# Patient Record
Sex: MALE | Race: OTHER | HISPANIC OR LATINO | Employment: OTHER | URBAN - METROPOLITAN AREA
[De-identification: names, ages, dates, MRNs, and addresses within clinical notes are randomized per-mention and may not be internally consistent; named-entity substitution may affect disease eponyms.]

---

## 2024-08-26 ENCOUNTER — OFFICE VISIT (OUTPATIENT)
Dept: PRIMARY CARE CLINIC | Facility: CLINIC | Age: 74
End: 2024-08-26
Payer: MEDICARE

## 2024-08-26 VITALS
HEART RATE: 60 BPM | WEIGHT: 181.56 LBS | OXYGEN SATURATION: 99 % | BODY MASS INDEX: 29.18 KG/M2 | HEIGHT: 66 IN | SYSTOLIC BLOOD PRESSURE: 152 MMHG | DIASTOLIC BLOOD PRESSURE: 86 MMHG

## 2024-08-26 DIAGNOSIS — N40.0 BENIGN PROSTATIC HYPERPLASIA, UNSPECIFIED WHETHER LOWER URINARY TRACT SYMPTOMS PRESENT: ICD-10-CM

## 2024-08-26 DIAGNOSIS — Z00.00 HEALTHCARE MAINTENANCE: ICD-10-CM

## 2024-08-26 DIAGNOSIS — Z12.5 PROSTATE CANCER SCREENING: ICD-10-CM

## 2024-08-26 DIAGNOSIS — I10 ESSENTIAL HYPERTENSION: ICD-10-CM

## 2024-08-26 DIAGNOSIS — E78.5 HYPERLIPIDEMIA, UNSPECIFIED HYPERLIPIDEMIA TYPE: ICD-10-CM

## 2024-08-26 DIAGNOSIS — Z76.89 ENCOUNTER TO ESTABLISH CARE: Primary | ICD-10-CM

## 2024-08-26 DIAGNOSIS — R73.03 PREDIABETES: ICD-10-CM

## 2024-08-26 PROCEDURE — 1160F RVW MEDS BY RX/DR IN RCRD: CPT | Mod: CPTII,S$GLB,, | Performed by: INTERNAL MEDICINE

## 2024-08-26 PROCEDURE — 1123F ACP DISCUSS/DSCN MKR DOCD: CPT | Mod: CPTII,S$GLB,, | Performed by: INTERNAL MEDICINE

## 2024-08-26 PROCEDURE — 3077F SYST BP >= 140 MM HG: CPT | Mod: CPTII,S$GLB,, | Performed by: INTERNAL MEDICINE

## 2024-08-26 PROCEDURE — 3288F FALL RISK ASSESSMENT DOCD: CPT | Mod: CPTII,S$GLB,, | Performed by: INTERNAL MEDICINE

## 2024-08-26 PROCEDURE — 4010F ACE/ARB THERAPY RXD/TAKEN: CPT | Mod: CPTII,S$GLB,, | Performed by: INTERNAL MEDICINE

## 2024-08-26 PROCEDURE — 99999 PR PBB SHADOW E&M-NEW PATIENT-LVL III: CPT | Mod: PBBFAC,,, | Performed by: INTERNAL MEDICINE

## 2024-08-26 PROCEDURE — 1159F MED LIST DOCD IN RCRD: CPT | Mod: CPTII,S$GLB,, | Performed by: INTERNAL MEDICINE

## 2024-08-26 PROCEDURE — 3008F BODY MASS INDEX DOCD: CPT | Mod: CPTII,S$GLB,, | Performed by: INTERNAL MEDICINE

## 2024-08-26 PROCEDURE — 3079F DIAST BP 80-89 MM HG: CPT | Mod: CPTII,S$GLB,, | Performed by: INTERNAL MEDICINE

## 2024-08-26 PROCEDURE — 1126F AMNT PAIN NOTED NONE PRSNT: CPT | Mod: CPTII,S$GLB,, | Performed by: INTERNAL MEDICINE

## 2024-08-26 PROCEDURE — 1101F PT FALLS ASSESS-DOCD LE1/YR: CPT | Mod: CPTII,S$GLB,, | Performed by: INTERNAL MEDICINE

## 2024-08-26 PROCEDURE — 99205 OFFICE O/P NEW HI 60 MIN: CPT | Mod: S$GLB,,, | Performed by: INTERNAL MEDICINE

## 2024-08-26 RX ORDER — FINASTERIDE 5 MG/1
5 TABLET, FILM COATED ORAL NIGHTLY
COMMUNITY
Start: 2024-07-19

## 2024-08-26 RX ORDER — OLMESARTAN MEDOXOMIL 40 MG/1
40 TABLET ORAL DAILY
Qty: 30 TABLET | Refills: 3 | Status: SHIPPED | OUTPATIENT
Start: 2024-08-26 | End: 2025-08-26

## 2024-08-26 RX ORDER — LOSARTAN POTASSIUM 100 MG/1
100 TABLET ORAL
COMMUNITY
Start: 2024-06-10 | End: 2024-08-26 | Stop reason: DRUGHIGH

## 2024-08-26 RX ORDER — HYDROCHLOROTHIAZIDE 25 MG/1
25 TABLET ORAL DAILY PRN
COMMUNITY
Start: 2024-08-02

## 2024-08-26 RX ORDER — ROSUVASTATIN CALCIUM 5 MG/1
5 TABLET, COATED ORAL
COMMUNITY
Start: 2024-07-19

## 2024-08-26 NOTE — PROGRESS NOTES
Subjective:       Patient ID: Nikolai Brown is a 73 y.o. male.    Chief Complaint: new patient       HPI  Nikolai Brown is a 73 y.o. male with HTN, HLD, BPH who presents today for new patient     Reports his PCP will be out on sick leave and needs to get established with new PCP.    Had COVID 1.5 weeks ago. Found out after having fever that lasted 1 day. Had mild symptoms and took Paxlovid when tested positive.     Has history of high blood pressure. Did not take his medication today. Monitors his blood pressure and SBP is usally beteween 130-140s and DBP in the 80s. He is on Lozartan 100mg and HCTZ 25mg. PCP was thinking on stopping HCTZ in the future. Denies headaches, chest pain, palpitations, lightheadedness/dizziness, or leg swelling.    His cholesterol was found elevated and was started on Crestor 5mg daily. Reports tolerating the medication well, no follow up labs yet. Tries to keep diet healthy but sounds he could decrease red meats.    Was having hesitancy and nocturia, improved with Finasteride. His PSA levels have been low, around 0.8.  No family history of Prostate cancer that he knowss of.    Recent labs with A1c of 5.7% and normal FBG. Previously fasting glucose was 114. Not on medication. See labs below.    Does not smoke or drink, drinks around 5 beers a week, no drugs. He is . Originally from [facility].       Advance Care Planning     Date: 08/26/2024    Living Will  During this visit, I engaged the patient  in the voluntary advance care planning process.  The patient and I reviewed the role for advance directives and their purpose in directing future healthcare if the patient's unable to speak for him/herself.  At this point in time, the patient does have full decision-making capacity.  We discussed different extreme health states that he could experience, and reviewed what kind of medical care he would want in those situations.  The patient communicated that if he were comatose and  had little chance of a meaningful recovery, he would not want machines/life-sustaining treatments used. The patient has completed a living will to reflect these preferences and The patient has already designated a healthcare power of  to make decisions on his behalf.  I spent a total of 7 minutes engaging the patient in this advance care planning discussion.          Power of   I initiated the process of voluntary advance care planning today and explained the importance of this process to the patient.  I introduced the concept of advance directives to the patient, as well. Then the patient received detailed information about the importance of designating a Health Care Power of  (HCPOA). He was also instructed to communicate with this person about their wishes for future healthcare, should he become sick and lose decision-making capacity. The patient has not previously appointed a HCPOA. After our discussion, the patient has decided to complete a HCPOA and has appointed his significant other, health care agent:  Lynn Brown  & health care agent number:  [phone] . I encouraged him to communicate with this person about their wishes for future healthcare, should he become sick and lose decision-making capacity.      A total of 7 min was spent on advance care planning, goals of care discussion, emotional support, formulating and communicating prognosis and exploring burden/benefit of various approaches of treatment. This discussion occurred on a fully voluntary basis with the verbal consent of the patient and/or family.        Health Maintenance:  Health Maintenance   Topic Date Due    Hepatitis C Screening  Never done    Lipid Panel  Never done    Shingles Vaccine (1 of 2) Never done    Colorectal Cancer Screening  01/10/2026    TETANUS VACCINE  08/12/2029       Review of Systems   Constitutional: Negative.  Negative for fever and unexpected weight change.   HENT: Negative.    "  Respiratory: Negative.  Negative for apnea (snores).    Cardiovascular: Negative.    Gastrointestinal: Negative.    Genitourinary:  Negative for difficulty urinating.   Musculoskeletal: Negative.    Integumentary:  Negative.   Neurological: Negative.    Psychiatric/Behavioral: Negative.        Past Medical History:   Diagnosis Date    BPH (benign prostatic hyperplasia)     HLD (hyperlipidemia)     HTN (hypertension)        History reviewed. No pertinent surgical history.    Family History   Problem Relation Name Age of Onset    Stroke Mother      Heart failure Mother      Hypertension Mother      Heart disease Mother      Rheum arthritis Mother      No Known Problems Sister 2     Cancer Brother 1         Lymphoma?    Hypertension Brother 1     Seizures Maternal Grandfather         Social History     Socioeconomic History    Marital status: Unknown   Tobacco Use    Smoking status: Never    Smokeless tobacco: Never   Substance and Sexual Activity    Alcohol use: Yes     Alcohol/week: 5.0 standard drinks of alcohol     Types: 5 Cans of beer per week       Current Outpatient Medications   Medication Sig Dispense Refill    finasteride (PROSCAR) 5 mg tablet Take 5 mg by mouth every evening.      hydroCHLOROthiazide (HYDRODIURIL) 25 MG tablet Take 25 mg by mouth daily as needed (High blood pressure). If systolic blood pressure is >150      rosuvastatin (CRESTOR) 5 MG tablet Take 5 mg by mouth.      olmesartan (BENICAR) 40 MG tablet Take 1 tablet (40 mg total) by mouth once daily. 30 tablet 3     No current facility-administered medications for this visit.       Review of patient's allergies indicates:  No Known Allergies      Objective:       Last 3 sets of Vitals        8/26/2024     8:52 AM   Vitals - 1 value per visit   SYSTOLIC 152   DIASTOLIC 86   Pulse 60   SPO2 99 %   Weight (lb) 181.55   Weight (kg) 82.35   Height 5' 6" (1.676 m)   BMI (Calculated) 29.3   Pain Score Zero   Physical Exam  Constitutional:       " General: He is not in acute distress.  HENT:      Head: Normocephalic.      Right Ear: Tympanic membrane, ear canal and external ear normal.      Left Ear: Tympanic membrane, ear canal and external ear normal.      Nose: Nose normal.      Mouth/Throat:      Mouth: Mucous membranes are moist.   Eyes:      General: No scleral icterus.     Extraocular Movements: Extraocular movements intact.      Conjunctiva/sclera: Conjunctivae normal.   Neck:      Vascular: No carotid bruit.      Comments: No goiter.  Cardiovascular:      Rate and Rhythm: Normal rate and regular rhythm.      Pulses: Normal pulses.      Heart sounds: Normal heart sounds.   Pulmonary:      Effort: Pulmonary effort is normal.      Breath sounds: Normal breath sounds.   Abdominal:      General: Bowel sounds are normal. There is no distension.      Palpations: Abdomen is soft. There is no mass.      Tenderness: There is no abdominal tenderness.   Musculoskeletal:         General: No swelling.   Lymphadenopathy:      Cervical: No cervical adenopathy.   Skin:     General: Skin is warm and dry.      Findings: Rash (raised erythematous on neck) present.   Neurological:      General: No focal deficit present.      Mental Status: He is alert and oriented to person, place, and time.   Psychiatric:         Mood and Affect: Mood normal.         Behavior: Behavior normal.                     Assessment:       1. Encounter to establish care    2. Essential hypertension    3. Hyperlipidemia, unspecified hyperlipidemia type    4. Prediabetes    5. Benign prostatic hyperplasia, unspecified whether lower urinary tract symptoms present    6. Prostate cancer screening    7. Healthcare maintenance            Plan:       1. Encounter to establish care    2. Essential hypertension  Overview:  On Losartan and HCTZ but changing to Olmesartan 40 mg daily.     Assessment & Plan:  Blood pressure not as controlled. Will change Losartan to longer actin ARB. May be able to get off  HCTZ. Will monitor daily and if SBP>150 will add HCTZ again.   F/U with nurse for BP check and with me in 1 month.  Encourage healthy low sodium, low fat diet, and low refined carbs.     Orders:  -     CBC Auto Differential; Future; Expected date: 08/26/2024  -     Comprehensive Metabolic Panel; Future; Expected date: 08/26/2024  -     Hemoglobin A1C; Future; Expected date: 08/26/2024  -     olmesartan (BENICAR) 40 MG tablet; Take 1 tablet (40 mg total) by mouth once daily.  Dispense: 30 tablet; Refill: 3    3. Hyperlipidemia, unspecified hyperlipidemia type  Overview:  On Crestor 5mg daily.    Assessment & Plan:  Follow up Lipid panel. May need increase in Crestor.  Lifestyle modification with exercise, weight monitoring, and low fat diet.       Orders:  -     Lipid Panel; Future; Expected date: 08/26/2024    4. Prediabetes  -     Comprehensive Metabolic Panel; Future; Expected date: 08/26/2024  -     Hemoglobin A1C; Future; Expected date: 08/26/2024    5. Benign prostatic hyperplasia, unspecified whether lower urinary tract symptoms present  Overview:  On finasteride 5mg daily.    Assessment & Plan:  Had mild obstructive symptoms in the past now improved. Same treatment.      6. Prostate cancer screening  Assessment & Plan:  - Yearly labs- 3/26/24, outside [email or web address], see copies.  - Colon cancer screening- Cologuard on 1/10/23, negative.  - ACP- completed 8/26/24.  - Vaccination- Due for RSV, Shingles, Pneumonia, COVID booster- declines them today.    Orders:  -     PSA, Screening; Future; Expected date: 08/26/2024    7. Healthcare maintenance  Assessment & Plan:  - Yearly labs- 3/26/24, outside [facility], see copies.  - Colon cancer screening- Cologuard on 1/10/23, negative.  - ACP- completed 8/26/24.  - Vaccination- Due for RSV, Shingles, Pneumonia, COVID booster- declines them today.         Health Maintenance Due   Topic Date Due    Hepatitis C Screening  Never done    Hemoglobin A1c (Prediabetes)  Never done     Lipid Panel  Never done    Shingles Vaccine (1 of 2) Never done    RSV Vaccine (Age 60+ and Pregnant patients) (1 - 1-dose 60+ series) Never done    Pneumococcal Vaccines (Age 65+) (1 of 1 - PCV) Never done    COVID-19 Vaccine (5 - 2023-24 season) 09/01/2023        This includes face to face time and non-face to face time preparing to see the patient (eg, review of tests), obtaining and/or reviewing separately obtained history, documenting clinical information in the electronic or other health record, independently interpreting results and communicating results to the patient/family/caregiver, or care coordinator.       Return to clinic in 1 months.  WS 3    Kady Nowak MD  [facility]  Disclaimer:  This note has been generated using voice-recognition software. There may be grammatical or spelling errors that have been missed during proof-reading

## 2024-08-27 NOTE — ASSESSMENT & PLAN NOTE
- Yearly labs- 3/26/24, outside [facility], see copies.  - Colon cancer screening- Cologuard on 1/10/23, negative.  - ACP- completed 8/26/24.  - Vaccination- Due for RSV, Shingles, Pneumonia, COVID booster- declines them today.

## 2024-08-27 NOTE — ASSESSMENT & PLAN NOTE
Follow up Lipid panel. May need increase in Crestor.  Lifestyle modification with exercise, weight monitoring, and low fat diet.

## 2024-08-27 NOTE — ASSESSMENT & PLAN NOTE
Blood pressure not as controlled. Will change Losartan to longer actin ARB. May be able to get off HCTZ. Will monitor daily and if SBP>150 will add HCTZ again.   F/U with nurse for BP check and with me in 1 month.  Encourage healthy low sodium, low fat diet, and low refined carbs.

## 2024-09-03 ENCOUNTER — TELEPHONE (OUTPATIENT)
Dept: PRIMARY CARE CLINIC | Facility: CLINIC | Age: 74
End: 2024-09-03
Payer: MEDICARE

## 2024-09-03 NOTE — TELEPHONE ENCOUNTER
Message  Received: Today  Virgilio Ramirez Staff  Caller: [phone] (Today,  2:21 PM)  Patient is returning a phone call.    Who left a message for the patient: Marifer    Does patient know what this is regarding:  missed call    Would you like a call back, or a response through your [facility] portal?:   call    Comments:

## 2024-09-09 ENCOUNTER — CLINICAL SUPPORT (OUTPATIENT)
Dept: PRIMARY CARE CLINIC | Facility: CLINIC | Age: 74
End: 2024-09-09
Payer: MEDICARE

## 2024-09-09 VITALS
HEART RATE: 67 BPM | DIASTOLIC BLOOD PRESSURE: 70 MMHG | BODY MASS INDEX: 28.82 KG/M2 | SYSTOLIC BLOOD PRESSURE: 122 MMHG | OXYGEN SATURATION: 96 % | WEIGHT: 178.56 LBS

## 2024-09-09 DIAGNOSIS — I10 ESSENTIAL HYPERTENSION: Primary | ICD-10-CM

## 2024-09-09 PROCEDURE — 99999 PR PBB SHADOW E&M-EST. PATIENT-LVL II: CPT | Mod: PBBFAC,,,

## 2024-09-09 NOTE — PROGRESS NOTES
Patient presented today for a nurse visit to check blood pressure. Pt's allergies, medications, medical history, and falls verified. Vital signs obtained. Blood pressure reading today was 122/70. All readings will be sent to Dr Nowak for review. Education provided on adequate hydration and cardiac diet. AVS given to patient.

## 2024-10-07 ENCOUNTER — LAB VISIT (OUTPATIENT)
Dept: LAB | Facility: HOSPITAL | Age: 74
End: 2024-10-07
Attending: INTERNAL MEDICINE
Payer: MEDICARE

## 2024-10-07 DIAGNOSIS — R73.03 PREDIABETES: ICD-10-CM

## 2024-10-07 DIAGNOSIS — Z12.5 PROSTATE CANCER SCREENING: ICD-10-CM

## 2024-10-07 DIAGNOSIS — I10 ESSENTIAL HYPERTENSION: ICD-10-CM

## 2024-10-07 DIAGNOSIS — E78.5 HYPERLIPIDEMIA, UNSPECIFIED HYPERLIPIDEMIA TYPE: ICD-10-CM

## 2024-10-07 LAB
ALBUMIN SERPL BCP-MCNC: 4.3 G/DL (ref 3.5–5.2)
ALP SERPL-CCNC: 62 U/L (ref 55–135)
ALT SERPL W/O P-5'-P-CCNC: 50 U/L (ref 10–44)
ANION GAP SERPL CALC-SCNC: 10 MMOL/L (ref 8–16)
AST SERPL-CCNC: 36 U/L (ref 10–40)
BASOPHILS # BLD AUTO: 0.05 K/UL (ref 0–0.2)
BASOPHILS NFR BLD: 1 % (ref 0–1.9)
BILIRUB SERPL-MCNC: 0.7 MG/DL (ref 0.1–1)
BUN SERPL-MCNC: 19 MG/DL (ref 8–23)
CALCIUM SERPL-MCNC: 9.9 MG/DL (ref 8.7–10.5)
CHLORIDE SERPL-SCNC: 108 MMOL/L (ref 95–110)
CHOLEST SERPL-MCNC: 178 MG/DL (ref 120–199)
CHOLEST/HDLC SERPL: 3.8 {RATIO} (ref 2–5)
CO2 SERPL-SCNC: 23 MMOL/L (ref 23–29)
COMPLEXED PSA SERPL-MCNC: 1.4 NG/ML (ref 0–4)
CREAT SERPL-MCNC: 1.2 MG/DL (ref 0.5–1.4)
DIFFERENTIAL METHOD BLD: ABNORMAL
EOSINOPHIL # BLD AUTO: 0.1 K/UL (ref 0–0.5)
EOSINOPHIL NFR BLD: 1.6 % (ref 0–8)
ERYTHROCYTE [DISTWIDTH] IN BLOOD BY AUTOMATED COUNT: 13.9 % (ref 11.5–14.5)
EST. GFR  (NO RACE VARIABLE): >60 ML/MIN/1.73 M^2
ESTIMATED AVG GLUCOSE: 111 MG/DL (ref 68–131)
GLUCOSE SERPL-MCNC: 100 MG/DL (ref 70–110)
HBA1C MFR BLD: 5.5 % (ref 4–5.6)
HCT VFR BLD AUTO: 42.4 % (ref 40–54)
HDLC SERPL-MCNC: 47 MG/DL (ref 40–75)
HDLC SERPL: 26.4 % (ref 20–50)
HGB BLD-MCNC: 13.7 G/DL (ref 14–18)
IMM GRANULOCYTES # BLD AUTO: 0.01 K/UL (ref 0–0.04)
IMM GRANULOCYTES NFR BLD AUTO: 0.2 % (ref 0–0.5)
LDLC SERPL CALC-MCNC: 91.4 MG/DL (ref 63–159)
LYMPHOCYTES # BLD AUTO: 1.7 K/UL (ref 1–4.8)
LYMPHOCYTES NFR BLD: 35.3 % (ref 18–48)
MCH RBC QN AUTO: 29 PG (ref 27–31)
MCHC RBC AUTO-ENTMCNC: 32.3 G/DL (ref 32–36)
MCV RBC AUTO: 90 FL (ref 82–98)
MONOCYTES # BLD AUTO: 0.6 K/UL (ref 0.3–1)
MONOCYTES NFR BLD: 11.6 % (ref 4–15)
NEUTROPHILS # BLD AUTO: 2.5 K/UL (ref 1.8–7.7)
NEUTROPHILS NFR BLD: 50.3 % (ref 38–73)
NONHDLC SERPL-MCNC: 131 MG/DL
NRBC BLD-RTO: 0 /100 WBC
PLATELET # BLD AUTO: 221 K/UL (ref 150–450)
PMV BLD AUTO: 10.4 FL (ref 9.2–12.9)
POTASSIUM SERPL-SCNC: 4.4 MMOL/L (ref 3.5–5.1)
PROT SERPL-MCNC: 7.6 G/DL (ref 6–8.4)
RBC # BLD AUTO: 4.72 M/UL (ref 4.6–6.2)
SODIUM SERPL-SCNC: 141 MMOL/L (ref 136–145)
TRIGL SERPL-MCNC: 198 MG/DL (ref 30–150)
WBC # BLD AUTO: 4.9 K/UL (ref 3.9–12.7)

## 2024-10-07 PROCEDURE — 84153 ASSAY OF PSA TOTAL: CPT | Mod: HCNC | Performed by: INTERNAL MEDICINE

## 2024-10-07 PROCEDURE — 83036 HEMOGLOBIN GLYCOSYLATED A1C: CPT | Mod: HCNC | Performed by: INTERNAL MEDICINE

## 2024-10-07 PROCEDURE — 80061 LIPID PANEL: CPT | Mod: HCNC | Performed by: INTERNAL MEDICINE

## 2024-10-07 PROCEDURE — 80053 COMPREHEN METABOLIC PANEL: CPT | Mod: HCNC | Performed by: INTERNAL MEDICINE

## 2024-10-07 PROCEDURE — 85025 COMPLETE CBC W/AUTO DIFF WBC: CPT | Mod: HCNC | Performed by: INTERNAL MEDICINE

## 2024-10-11 ENCOUNTER — OFFICE VISIT (OUTPATIENT)
Dept: PRIMARY CARE CLINIC | Facility: CLINIC | Age: 74
End: 2024-10-11
Payer: MEDICARE

## 2024-10-11 VITALS
DIASTOLIC BLOOD PRESSURE: 82 MMHG | OXYGEN SATURATION: 98 % | BODY MASS INDEX: 29.28 KG/M2 | HEIGHT: 66 IN | WEIGHT: 182.19 LBS | SYSTOLIC BLOOD PRESSURE: 134 MMHG | HEART RATE: 68 BPM

## 2024-10-11 DIAGNOSIS — E78.5 HYPERLIPIDEMIA, UNSPECIFIED HYPERLIPIDEMIA TYPE: ICD-10-CM

## 2024-10-11 DIAGNOSIS — N40.0 BENIGN PROSTATIC HYPERPLASIA WITHOUT LOWER URINARY TRACT SYMPTOMS: ICD-10-CM

## 2024-10-11 DIAGNOSIS — I10 ESSENTIAL HYPERTENSION: Primary | ICD-10-CM

## 2024-10-11 DIAGNOSIS — Z23 NEED FOR INFLUENZA VACCINATION: ICD-10-CM

## 2024-10-11 PROCEDURE — 99214 OFFICE O/P EST MOD 30 MIN: CPT | Mod: HCNC,25,S$GLB, | Performed by: INTERNAL MEDICINE

## 2024-10-11 PROCEDURE — 1157F ADVNC CARE PLAN IN RCRD: CPT | Mod: HCNC,CPTII,S$GLB, | Performed by: INTERNAL MEDICINE

## 2024-10-11 PROCEDURE — 90653 IIV ADJUVANT VACCINE IM: CPT | Mod: HCNC,S$GLB,, | Performed by: INTERNAL MEDICINE

## 2024-10-11 PROCEDURE — 4010F ACE/ARB THERAPY RXD/TAKEN: CPT | Mod: HCNC,CPTII,S$GLB, | Performed by: INTERNAL MEDICINE

## 2024-10-11 PROCEDURE — 1160F RVW MEDS BY RX/DR IN RCRD: CPT | Mod: HCNC,CPTII,S$GLB, | Performed by: INTERNAL MEDICINE

## 2024-10-11 PROCEDURE — 1159F MED LIST DOCD IN RCRD: CPT | Mod: HCNC,CPTII,S$GLB, | Performed by: INTERNAL MEDICINE

## 2024-10-11 PROCEDURE — 1101F PT FALLS ASSESS-DOCD LE1/YR: CPT | Mod: HCNC,CPTII,S$GLB, | Performed by: INTERNAL MEDICINE

## 2024-10-11 PROCEDURE — 3075F SYST BP GE 130 - 139MM HG: CPT | Mod: HCNC,CPTII,S$GLB, | Performed by: INTERNAL MEDICINE

## 2024-10-11 PROCEDURE — 3079F DIAST BP 80-89 MM HG: CPT | Mod: HCNC,CPTII,S$GLB, | Performed by: INTERNAL MEDICINE

## 2024-10-11 PROCEDURE — 3008F BODY MASS INDEX DOCD: CPT | Mod: HCNC,CPTII,S$GLB, | Performed by: INTERNAL MEDICINE

## 2024-10-11 PROCEDURE — 3044F HG A1C LEVEL LT 7.0%: CPT | Mod: HCNC,CPTII,S$GLB, | Performed by: INTERNAL MEDICINE

## 2024-10-11 PROCEDURE — 1126F AMNT PAIN NOTED NONE PRSNT: CPT | Mod: HCNC,CPTII,S$GLB, | Performed by: INTERNAL MEDICINE

## 2024-10-11 PROCEDURE — 99999 PR PBB SHADOW E&M-EST. PATIENT-LVL III: CPT | Mod: PBBFAC,HCNC,, | Performed by: INTERNAL MEDICINE

## 2024-10-11 PROCEDURE — 3288F FALL RISK ASSESSMENT DOCD: CPT | Mod: HCNC,CPTII,S$GLB, | Performed by: INTERNAL MEDICINE

## 2024-10-11 PROCEDURE — G0008 ADMIN INFLUENZA VIRUS VAC: HCPCS | Mod: HCNC,S$GLB,, | Performed by: INTERNAL MEDICINE

## 2024-10-11 RX ORDER — ROSUVASTATIN CALCIUM 10 MG/1
10 TABLET, COATED ORAL DAILY
Qty: 90 TABLET | Refills: 3 | Status: SHIPPED | OUTPATIENT
Start: 2024-10-11 | End: 2025-10-11

## 2024-10-13 NOTE — ASSESSMENT & PLAN NOTE
- Had mild obstructive symptoms in the past now improved.  No longer taking finasteride eyes.  Feels saw palmetto helps.  - If obstructive symptoms returned would try tamsulosin and urology evaluation.  - PSA was within normal limits, 1.4.

## 2024-10-13 NOTE — PROGRESS NOTES
Subjective:       Patient ID: Nikolai Brown is a 74 y.o. male.    Chief Complaint: Results      HPI  Nikolai Brown is a 74 y.o. male with HTN, HLD, Prediabetes, BPH who presents today for Results    Nikolai presents today for follow up.    He reports significant improvement in blood pressure control following medication adjustments. He is currently taking half the dose of the olmesartan, which has effectively lowered his blood pressure. Home blood pressure readings are consistently in the 114-116 range. He denies any side effects or concerns with the current regimen. He is enrolling in the [facility] Program, to allow view his blood pressure history and displays his target goal of less than 140/90. Blood pressure sometimes similar to today's and will try taking the full tablet.    He reports taking finasteride, which has been effective in managing his previously experienced frequent urination. He mentions his brother is taking saw palmetto, a supplement for prostate health, with good results, noting significant improvement in his brother's urinary frequency.  PSA levels are reported as normal.    Recent lab results show slightly low hemoglobin and slightly elevated liver enzymes. Cholesterol and triglycerides are noted to be high, with LDL cholesterol at 91.4 mg/dL, which is above the recommended target of less than 70 mg/dL for patients with hypertension.    He enjoys red meat but is attempting to reduce fat intake in his diet. He is trying to incorporate more vegetables and lean proteins such as chicken and fish. He exercises regularly. He prepares homemade beans, cooked whole with onions and other ingredients, not using canned varieties. His weight remains stable between 175-180 lbs.    He reports taking Rosuvastatin for cholesterol management. The dosage is being increased from its current low dose to 10 mg daily. He prefers to take one pill rather than two.    His fasting glucose and A1c came back  within normal limits.    He reports a recent episode of severe sore throat. COVID-19 testing was negative. He denies any current symptoms related to this recent illness.      ROS:  General: -fever, -chills, -fatigue, -weight gain, -weight loss  Eyes: -vision changes, -redness, -discharge  ENT: -ear pain, -nasal congestion, -sore throat  Cardiovascular: -chest pain, -palpitations, -lower extremity edema  Respiratory: -cough, -shortness of breath  Gastrointestinal: -abdominal pain, -nausea, -vomiting, -diarrhea, -constipation, -blood in stool  Genitourinary: -dysuria, -hematuria, -frequency  Musculoskeletal: -joint pain, -muscle pain  Skin: -rash, -lesion  Neurological: -headache, -dizziness, -numbness, -tingling  Psychiatric: -anxiety, -depression, -sleep difficulty          Past Medical History:   Diagnosis Date    BPH (benign prostatic hyperplasia)     HLD (hyperlipidemia)     HTN (hypertension)        No past surgical history on file.    Family History   Problem Relation Name Age of Onset    Stroke Mother      Heart failure Mother      Hypertension Mother      Heart disease Mother      Rheum arthritis Mother      No Known Problems Sister 2     Cancer Brother 1         Lymphoma?    Hypertension Brother 1     Seizures Maternal Grandfather         Social History     Socioeconomic History    Marital status: Unknown   Tobacco Use    Smoking status: Never    Smokeless tobacco: Never   Substance and Sexual Activity    Alcohol use: Yes     Alcohol/week: 5.0 standard drinks of alcohol     Types: 5 Cans of beer per week     Social Drivers of Health     Financial Resource Strain: Low Risk  (9/2/2024)    Overall Financial Resource Strain (CARDIA)     Difficulty of Paying Living Expenses: Not very hard   Food Insecurity: No Food Insecurity (9/2/2024)    Hunger Vital Sign     Worried About Running Out of Food in the Last Year: Never true     Ran Out of Food in the Last Year: Never true   Physical Activity: Sufficiently Active  "(9/2/2024)    Exercise Vital Sign     Days of Exercise per Week: 6 days     Minutes of Exercise per Session: 60 min   Stress: No Stress Concern Present (9/2/2024)    [employer] - Occupational Stress Questionnaire     Feeling of Stress : Not at all   Housing Stability: Unknown (9/2/2024)    Housing Stability Vital Sign     Unable to Pay for Housing in the Last Year: No       Current Outpatient Medications   Medication Sig Dispense Refill    olmesartan (BENICAR) 40 MG tablet Take 1 tablet (40 mg total) by mouth once daily. 30 tablet 3    rosuvastatin (CRESTOR) 10 MG tablet Take 1 tablet (10 mg total) by mouth once daily. 90 tablet 3     No current facility-administered medications for this visit.       Review of patient's allergies indicates:  No Known Allergies      Objective:       Last 3 sets of Vitals        8/26/2024     8:52 AM 9/9/2024     9:29 AM 10/11/2024    11:19 AM   Vitals - 1 value per visit   SYSTOLIC 152 122 134   DIASTOLIC 86 70 82   Pulse 60 67 68   SPO2 99 % 96 % 98 %   Weight (lb) 181.55 178.57 182.21   Weight (kg) 82.35 81 82.65   Height 5' 6" (1.676 m)  5' 6" (1.676 m)   BMI (Calculated) 29.3  29.4   Pain Score Zero Zero Zero   Physical Exam  Constitutional:       General: He is not in acute distress.     Appearance: Normal appearance.   Eyes:      General: No scleral icterus.     Extraocular Movements: Extraocular movements intact.      Conjunctiva/sclera: Conjunctivae normal.   Neck:      Comments: No goiter.  Cardiovascular:      Rate and Rhythm: Normal rate and regular rhythm.      Pulses: Normal pulses.      Heart sounds: Normal heart sounds.   Pulmonary:      Effort: Pulmonary effort is normal.      Breath sounds: Normal breath sounds.   Abdominal:      General: Bowel sounds are normal. There is no distension.      Palpations: Abdomen is soft.   Musculoskeletal:         General: No swelling. Normal range of motion.   Lymphadenopathy:      Cervical: No cervical " adenopathy.   Skin:     General: Skin is warm and dry.   Neurological:      General: No focal deficit present.      Mental Status: He is alert and oriented to person, place, and time.   Psychiatric:         Mood and Affect: Mood normal.         Behavior: Behavior normal.           CBC:  Recent Labs   Lab 10/07/24  0825   WBC 4.90   RBC 4.72   Hemoglobin 13.7 L   Hematocrit 42.4   Platelets 221   MCV 90   MCH 29.0   MCHC 32.3     CMP:  Recent Labs   Lab 10/07/24  0825   Glucose 100   Calcium 9.9   Albumin 4.3   Total Protein 7.6   Sodium 141   Potassium 4.4   CO2 23   Chloride 108   BUN 19   Creatinine 1.2   Alkaline Phosphatase 62   ALT 50 H   AST 36   Total Bilirubin 0.7     URINALYSIS:       LIPIDS:  Recent Labs   Lab 10/07/24  0825   HDL 47   Cholesterol 178   Triglycerides 198 H   LDL Cholesterol 91.4   HDL/Cholesterol Ratio 26.4   Non-HDL Cholesterol 131   Total Cholesterol/HDL Ratio 3.8     TSH:        A1C:  Recent Labs   Lab 10/07/24  0825   Hemoglobin A1C 5.5       Imaging:  No image results found.      Assessment:       1. Essential hypertension    2. Hyperlipidemia, unspecified hyperlipidemia type    3. Need for influenza vaccination    4. Benign prostatic hyperplasia without lower urinary tract symptoms            Plan:       1. Essential hypertension  Overview:  On Losartan and HCTZ but changing to Olmesartan 40 mg daily.     Assessment & Plan:  - Assessed blood pressure management; noted improvement with current regimen  - Explained prefers blood pressures of <130/80 mmHg.  - Continued Benicar at 40 mg and monitor blood pressure.   - If BP closer to 100 mmHg and feeling tired with the Benicar 40 mg, ok take half and notify doctor.  - Discontinued hydrochlorothiazide 25 mg.    Orders:  -     Comprehensive Metabolic Panel; Future; Expected date: 10/11/2024    2. Hyperlipidemia, unspecified hyperlipidemia type  Overview:  On Crestor 5mg daily.    Assessment & Plan:  - Educated on foods to limit for  managing cholesterol and triglycerides.  - Increased Rosuvastatin from current dose to 10 mg daily.  - Ordered cholesterol panel.  - Advised on vegetables to focus on: broccoli, cauliflower, zucchini, celery, peppers, and other vegetable intake, focusing on low-carbohydrate options.  Decrease red meat, processed foods, and sweets.  - Nikolai to continue regular exercise routine.  - noted mild increase in ALT possibly due to diet.  Will follow-up metabolic panel.    Orders:  -     rosuvastatin (CRESTOR) 10 MG tablet; Take 1 tablet (10 mg total) by mouth once daily.  Dispense: 90 tablet; Refill: 3  -     Lipid Panel; Future; Expected date: 10/11/2024    3. Need for influenza vaccination  -     influenza (adjuvanted) (Fluad) 45 mcg/0.5 mL IM vaccine (> or = 64 yo) 0.5 mL    4. Benign prostatic hyperplasia without lower urinary tract symptoms  Overview:  On finasteride 5mg daily.    Assessment & Plan:  - Had mild obstructive symptoms in the past now improved.  No longer taking finasteride eyes.  Feels saw palmetto helps.  - If obstructive symptoms returned would try tamsulosin and urology evaluation.  - PSA was within normal limits, 1.4.             Health Maintenance Due   Topic Date Due    Hepatitis C Screening  Never done    Shingles Vaccine (1 of 2) Never done    RSV Vaccine (Age 60+ and Pregnant patients) (1 - Risk 60-74 years 1-dose series) Never done    Pneumococcal Vaccines (Age 65+) (1 of 1 - PCV) Never done    COVID-19 Vaccine (5 - 2024-25 season) 09/01/2024        I spent a total of 30 minutes on the day of the visit.This includes face to face time and non-face to face time preparing to see the patient (eg, review of tests), obtaining and/or reviewing separately obtained history, documenting clinical information in the electronic or other health record, independently interpreting results and communicating results to the patient/family/caregiver, or care coordinator.       Return to clinic in 3-4 months.  KEVEN  2    Kady Nowak MD  [facility]  Disclaimer:  This note has been generated using voice-recognition software. There may be grammatical or spelling errors that have been missed during proof-reading

## 2024-10-13 NOTE — ASSESSMENT & PLAN NOTE
- Educated on foods to limit for managing cholesterol and triglycerides.  - Increased Rosuvastatin from current dose to 10 mg daily.  - Ordered cholesterol panel.  - Advised on vegetables to focus on: broccoli, cauliflower, zucchini, celery, peppers, and other vegetable intake, focusing on low-carbohydrate options.  Decrease red meat, processed foods, and sweets.  - Nikolai to continue regular exercise routine.  - noted mild increase in ALT possibly due to diet.  Will follow-up metabolic panel.

## 2024-10-13 NOTE — ASSESSMENT & PLAN NOTE
- Assessed blood pressure management; noted improvement with current regimen  - Explained prefers blood pressures of <130/80 mmHg.  - Continued Benicar at 40 mg and monitor blood pressure.   - If BP closer to 100 mmHg and feeling tired with the Benicar 40 mg, ok take half and notify doctor.  - Discontinued hydrochlorothiazide 25 mg.

## 2024-10-29 DIAGNOSIS — Z00.00 ENCOUNTER FOR MEDICARE ANNUAL WELLNESS EXAM: ICD-10-CM

## 2024-11-20 ENCOUNTER — OFFICE VISIT (OUTPATIENT)
Dept: PRIMARY CARE CLINIC | Facility: CLINIC | Age: 74
End: 2024-11-20
Payer: MEDICARE

## 2024-11-20 VITALS
SYSTOLIC BLOOD PRESSURE: 149 MMHG | WEIGHT: 169.19 LBS | DIASTOLIC BLOOD PRESSURE: 90 MMHG | HEART RATE: 76 BPM | BODY MASS INDEX: 27.31 KG/M2 | OXYGEN SATURATION: 97 %

## 2024-11-20 DIAGNOSIS — G89.29 CHRONIC RIGHT-SIDED LOW BACK PAIN WITH RIGHT-SIDED SCIATICA: Primary | ICD-10-CM

## 2024-11-20 DIAGNOSIS — M54.41 CHRONIC RIGHT-SIDED LOW BACK PAIN WITH RIGHT-SIDED SCIATICA: Primary | ICD-10-CM

## 2024-11-20 DIAGNOSIS — I10 ESSENTIAL HYPERTENSION: ICD-10-CM

## 2024-11-20 PROCEDURE — 1157F ADVNC CARE PLAN IN RCRD: CPT | Mod: HCNC,CPTII,S$GLB, | Performed by: PHYSICIAN ASSISTANT

## 2024-11-20 PROCEDURE — 3288F FALL RISK ASSESSMENT DOCD: CPT | Mod: HCNC,CPTII,S$GLB, | Performed by: PHYSICIAN ASSISTANT

## 2024-11-20 PROCEDURE — 1125F AMNT PAIN NOTED PAIN PRSNT: CPT | Mod: HCNC,CPTII,S$GLB, | Performed by: PHYSICIAN ASSISTANT

## 2024-11-20 PROCEDURE — 1160F RVW MEDS BY RX/DR IN RCRD: CPT | Mod: HCNC,CPTII,S$GLB, | Performed by: PHYSICIAN ASSISTANT

## 2024-11-20 PROCEDURE — 1101F PT FALLS ASSESS-DOCD LE1/YR: CPT | Mod: HCNC,CPTII,S$GLB, | Performed by: PHYSICIAN ASSISTANT

## 2024-11-20 PROCEDURE — 1159F MED LIST DOCD IN RCRD: CPT | Mod: HCNC,CPTII,S$GLB, | Performed by: PHYSICIAN ASSISTANT

## 2024-11-20 PROCEDURE — 4010F ACE/ARB THERAPY RXD/TAKEN: CPT | Mod: HCNC,CPTII,S$GLB, | Performed by: PHYSICIAN ASSISTANT

## 2024-11-20 PROCEDURE — 3080F DIAST BP >= 90 MM HG: CPT | Mod: HCNC,CPTII,S$GLB, | Performed by: PHYSICIAN ASSISTANT

## 2024-11-20 PROCEDURE — 3077F SYST BP >= 140 MM HG: CPT | Mod: HCNC,CPTII,S$GLB, | Performed by: PHYSICIAN ASSISTANT

## 2024-11-20 PROCEDURE — 99214 OFFICE O/P EST MOD 30 MIN: CPT | Mod: HCNC,25,S$GLB, | Performed by: PHYSICIAN ASSISTANT

## 2024-11-20 PROCEDURE — 3008F BODY MASS INDEX DOCD: CPT | Mod: HCNC,CPTII,S$GLB, | Performed by: PHYSICIAN ASSISTANT

## 2024-11-20 PROCEDURE — 3044F HG A1C LEVEL LT 7.0%: CPT | Mod: HCNC,CPTII,S$GLB, | Performed by: PHYSICIAN ASSISTANT

## 2024-11-20 PROCEDURE — 99999 PR PBB SHADOW E&M-EST. PATIENT-LVL III: CPT | Mod: PBBFAC,HCNC,, | Performed by: PHYSICIAN ASSISTANT

## 2024-11-20 PROCEDURE — 96372 THER/PROPH/DIAG INJ SC/IM: CPT | Mod: HCNC,S$GLB,, | Performed by: PHYSICIAN ASSISTANT

## 2024-11-20 RX ORDER — METHYLPREDNISOLONE SOD SUCC 125 MG
125 VIAL (EA) INJECTION ONCE
Status: COMPLETED | OUTPATIENT
Start: 2024-11-20 | End: 2024-11-20

## 2024-11-20 RX ADMIN — Medication 125 MG: at 11:11

## 2024-11-20 NOTE — ASSESSMENT & PLAN NOTE
Elevated today, possibly due to pain. He states he started taking a whole pill of benicar yesterday, continue  Nurse BPC in 2 weeks

## 2024-11-20 NOTE — ASSESSMENT & PLAN NOTE
States steroid injections helped in the past. Will do today, he has muscle relaxer at home. Will try one tonight. Stretching exercises given today  If no change in pain will call. Poss refer to PT

## 2024-11-20 NOTE — PROGRESS NOTES
"  Primary Care Provider Appointment   [facility]        Subjective:       Patient ID:  Nikolai is a 74 y.o. male being seen for Back Pain      Chief Complaint: Back Pain    HPI: 75 yo male presents for back pain. Recurrent issue. Has "bulging disc". Occurring for past week. Occurring in the right lower back. Worse at night when sleeping. Pain radiates to the hip and sometimes down to the ankle. Taking advil and another pain med but doesn't know the name of it, but not helping. No injury or known cause. Denies numbness and tingling. No bowel or bladder incontinence.   He exercises regularly and is overall very active.    Past Medical History:   Diagnosis Date    BPH (benign prostatic hyperplasia)     HLD (hyperlipidemia)     HTN (hypertension)        Review of Systems   Constitutional:  Negative for fever.   Cardiovascular:  Negative for chest pain.   Gastrointestinal:  Negative for abdominal pain.   Genitourinary:  Negative for bladder incontinence, dysuria and hematuria.   Musculoskeletal:  Positive for back pain and leg pain.   Neurological:  Negative for weakness, numbness and headaches.             Health Maintenance         Date Due Completion Date    Hepatitis C Screening Never done ---    Shingles Vaccine (1 of 2) Never done ---    RSV Vaccine (Age 60+ and Pregnant patients) (1 - Risk 60-74 years 1-dose series) Never done ---    Pneumococcal Vaccines (Age 65+) (1 of 1 - PCV) Never done ---    COVID-19 Vaccine (5 - 2024-25 season) 09/01/2024 12/28/2022    Hemoglobin A1c (Prediabetes) 10/07/2025 10/7/2024    Colorectal Cancer Screening 01/10/2026 1/10/2023    TETANUS VACCINE 08/12/2029 8/12/2019    Lipid Panel 10/07/2029 10/7/2024                     Objective:      Vitals:    11/20/24 1107   BP: (!) 149/90   BP Location: Left forearm   Patient Position: Sitting   Pulse: 76   SpO2: 97%   Weight: 76.8 kg (169 lb 3.3 oz)     Estimated body mass index is 27.31 kg/m² as " "calculated from the following:    Height as of 10/11/24: 5' 6" (1.676 m).    Weight as of this encounter: 76.8 kg (169 lb 3.3 oz).  Physical Exam  Constitutional:       Appearance: Normal appearance.   HENT:      Head: Normocephalic and atraumatic.   Musculoskeletal:        Back:    Neurological:      Mental Status: He is alert.   Psychiatric:         Mood and Affect: Mood normal.         Thought Content: Thought content normal.           Assessment and Plan:         1. Chronic right-sided low back pain with right-sided sciatica  Assessment & Plan:  States steroid injections helped in the past. Will do today, he has muscle relaxer at home. Will try one tonight. Stretching exercises given today  If no change in pain will call. Poss refer to PT    Orders:  -     methylPREDNISolone sodium succinate injection 125 mg    2. Essential hypertension  Overview:  On Losartan and HCTZ but changing to Olmesartan 40 mg daily.     Assessment & Plan:  Elevated today, possibly due to pain. He states he started taking a whole pill of benicar yesterday, continue  Nurse BPC in 2 weeks           Follow Up:   F/u in 2 weeks for nurse BPC        Amy Lado, PA-C [facility]   "

## 2024-12-16 DIAGNOSIS — I10 ESSENTIAL HYPERTENSION: ICD-10-CM

## 2024-12-16 RX ORDER — OLMESARTAN MEDOXOMIL 20 MG/1
20 TABLET ORAL DAILY
Qty: 90 TABLET | Refills: 1 | Status: SHIPPED | OUTPATIENT
Start: 2024-12-16 | End: 2025-12-16

## 2025-01-10 ENCOUNTER — LAB VISIT (OUTPATIENT)
Dept: LAB | Facility: HOSPITAL | Age: 75
End: 2025-01-10
Attending: INTERNAL MEDICINE
Payer: MEDICARE

## 2025-01-10 DIAGNOSIS — E78.5 HYPERLIPIDEMIA, UNSPECIFIED HYPERLIPIDEMIA TYPE: ICD-10-CM

## 2025-01-10 DIAGNOSIS — I10 ESSENTIAL HYPERTENSION: ICD-10-CM

## 2025-01-10 LAB
ALBUMIN SERPL BCP-MCNC: 4.5 G/DL (ref 3.5–5.2)
ALP SERPL-CCNC: 56 U/L (ref 40–150)
ALT SERPL W/O P-5'-P-CCNC: 42 U/L (ref 10–44)
ANION GAP SERPL CALC-SCNC: 11 MMOL/L (ref 8–16)
AST SERPL-CCNC: 26 U/L (ref 10–40)
BILIRUB SERPL-MCNC: 0.6 MG/DL (ref 0.1–1)
BUN SERPL-MCNC: 21 MG/DL (ref 8–23)
CALCIUM SERPL-MCNC: 9.8 MG/DL (ref 8.7–10.5)
CHLORIDE SERPL-SCNC: 106 MMOL/L (ref 95–110)
CHOLEST SERPL-MCNC: 171 MG/DL (ref 120–199)
CHOLEST/HDLC SERPL: 3.6 {RATIO} (ref 2–5)
CO2 SERPL-SCNC: 25 MMOL/L (ref 23–29)
CREAT SERPL-MCNC: 1.2 MG/DL (ref 0.5–1.4)
EST. GFR  (NO RACE VARIABLE): >60 ML/MIN/1.73 M^2
GLUCOSE SERPL-MCNC: 93 MG/DL (ref 70–110)
HDLC SERPL-MCNC: 47 MG/DL (ref 40–75)
HDLC SERPL: 27.5 % (ref 20–50)
LDLC SERPL CALC-MCNC: 87 MG/DL (ref 63–159)
NONHDLC SERPL-MCNC: 124 MG/DL
POTASSIUM SERPL-SCNC: 4.6 MMOL/L (ref 3.5–5.1)
PROT SERPL-MCNC: 7.4 G/DL (ref 6–8.4)
SODIUM SERPL-SCNC: 142 MMOL/L (ref 136–145)
TRIGL SERPL-MCNC: 185 MG/DL (ref 30–150)

## 2025-01-10 PROCEDURE — 80053 COMPREHEN METABOLIC PANEL: CPT | Mod: HCNC | Performed by: INTERNAL MEDICINE

## 2025-01-10 PROCEDURE — 80061 LIPID PANEL: CPT | Mod: HCNC | Performed by: INTERNAL MEDICINE

## 2025-01-10 PROCEDURE — 36415 COLL VENOUS BLD VENIPUNCTURE: CPT | Mod: HCNC | Performed by: INTERNAL MEDICINE

## 2025-01-13 ENCOUNTER — OFFICE VISIT (OUTPATIENT)
Dept: PRIMARY CARE CLINIC | Facility: CLINIC | Age: 75
End: 2025-01-13
Payer: MEDICARE

## 2025-01-13 VITALS
WEIGHT: 185.94 LBS | SYSTOLIC BLOOD PRESSURE: 126 MMHG | HEIGHT: 66 IN | OXYGEN SATURATION: 98 % | BODY MASS INDEX: 29.88 KG/M2 | DIASTOLIC BLOOD PRESSURE: 82 MMHG | HEART RATE: 86 BPM

## 2025-01-13 DIAGNOSIS — N40.0 BENIGN PROSTATIC HYPERPLASIA WITHOUT LOWER URINARY TRACT SYMPTOMS: ICD-10-CM

## 2025-01-13 DIAGNOSIS — E78.5 HYPERLIPIDEMIA, UNSPECIFIED HYPERLIPIDEMIA TYPE: ICD-10-CM

## 2025-01-13 DIAGNOSIS — M54.16 LUMBAR RADICULOPATHY, CHRONIC: ICD-10-CM

## 2025-01-13 DIAGNOSIS — Z00.00 HEALTHCARE MAINTENANCE: ICD-10-CM

## 2025-01-13 DIAGNOSIS — I10 ESSENTIAL HYPERTENSION: Primary | ICD-10-CM

## 2025-01-13 PROCEDURE — 1160F RVW MEDS BY RX/DR IN RCRD: CPT | Mod: CPTII,S$GLB,, | Performed by: INTERNAL MEDICINE

## 2025-01-13 PROCEDURE — 3074F SYST BP LT 130 MM HG: CPT | Mod: CPTII,S$GLB,, | Performed by: INTERNAL MEDICINE

## 2025-01-13 PROCEDURE — 4010F ACE/ARB THERAPY RXD/TAKEN: CPT | Mod: CPTII,S$GLB,, | Performed by: INTERNAL MEDICINE

## 2025-01-13 PROCEDURE — 3008F BODY MASS INDEX DOCD: CPT | Mod: CPTII,S$GLB,, | Performed by: INTERNAL MEDICINE

## 2025-01-13 PROCEDURE — 1125F AMNT PAIN NOTED PAIN PRSNT: CPT | Mod: CPTII,S$GLB,, | Performed by: INTERNAL MEDICINE

## 2025-01-13 PROCEDURE — 1157F ADVNC CARE PLAN IN RCRD: CPT | Mod: CPTII,S$GLB,, | Performed by: INTERNAL MEDICINE

## 2025-01-13 PROCEDURE — 3288F FALL RISK ASSESSMENT DOCD: CPT | Mod: CPTII,S$GLB,, | Performed by: INTERNAL MEDICINE

## 2025-01-13 PROCEDURE — 1101F PT FALLS ASSESS-DOCD LE1/YR: CPT | Mod: CPTII,S$GLB,, | Performed by: INTERNAL MEDICINE

## 2025-01-13 PROCEDURE — 99215 OFFICE O/P EST HI 40 MIN: CPT | Mod: 25,S$GLB,, | Performed by: INTERNAL MEDICINE

## 2025-01-13 PROCEDURE — 1159F MED LIST DOCD IN RCRD: CPT | Mod: CPTII,S$GLB,, | Performed by: INTERNAL MEDICINE

## 2025-01-13 PROCEDURE — 3079F DIAST BP 80-89 MM HG: CPT | Mod: CPTII,S$GLB,, | Performed by: INTERNAL MEDICINE

## 2025-01-13 PROCEDURE — 96372 THER/PROPH/DIAG INJ SC/IM: CPT | Mod: S$GLB,,, | Performed by: INTERNAL MEDICINE

## 2025-01-13 PROCEDURE — 99999 PR PBB SHADOW E&M-EST. PATIENT-LVL V: CPT | Mod: PBBFAC,,, | Performed by: INTERNAL MEDICINE

## 2025-01-13 RX ORDER — MAGNESIUM 250 MG
TABLET ORAL ONCE
COMMUNITY

## 2025-01-13 RX ORDER — TIZANIDINE 4 MG/1
4 TABLET ORAL 3 TIMES DAILY PRN
Qty: 30 TABLET | Refills: 0 | Status: SHIPPED | OUTPATIENT
Start: 2025-01-13 | End: 2025-01-24

## 2025-01-13 RX ORDER — MULTIVIT-MINERALS/FOLIC ACID 80 MCG
TABLET,CHEWABLE ORAL
COMMUNITY

## 2025-01-13 RX ORDER — IBUPROFEN 100 MG/5ML
1000 SUSPENSION, ORAL (FINAL DOSE FORM) ORAL DAILY
COMMUNITY

## 2025-01-13 RX ORDER — TRIAMCINOLONE ACETONIDE 40 MG/ML
80 INJECTION, SUSPENSION INTRA-ARTICULAR; INTRAMUSCULAR ONCE
Status: COMPLETED | OUTPATIENT
Start: 2025-01-13 | End: 2025-01-13

## 2025-01-13 RX ORDER — HYDROCORTISONE AND ACETIC ACID 20.75; 10.375 MG/ML; MG/ML
3 SOLUTION AURICULAR (OTIC) 2 TIMES DAILY
Qty: 10 ML | Refills: 0 | Status: SHIPPED | OUTPATIENT
Start: 2025-01-13 | End: 2025-02-03

## 2025-01-13 RX ORDER — LORATADINE 10 MG/1
10 TABLET ORAL DAILY PRN
Qty: 30 TABLET | Refills: 0 | Status: SHIPPED | OUTPATIENT
Start: 2025-01-13 | End: 2026-01-13

## 2025-01-13 RX ORDER — NAPROXEN 500 MG/1
500 TABLET ORAL EVERY 12 HOURS PRN
Qty: 40 TABLET | Refills: 0 | Status: SHIPPED | OUTPATIENT
Start: 2025-01-13

## 2025-01-13 RX ORDER — AMLODIPINE BESYLATE 5 MG/1
5 TABLET ORAL DAILY
Qty: 90 TABLET | Refills: 3 | Status: SHIPPED | OUTPATIENT
Start: 2025-01-13 | End: 2025-02-03 | Stop reason: DRUGHIGH

## 2025-01-13 RX ORDER — OLMESARTAN MEDOXOMIL 40 MG/1
40 TABLET ORAL DAILY
Qty: 90 TABLET | Refills: 2 | Status: SHIPPED | OUTPATIENT
Start: 2025-01-13 | End: 2026-01-13

## 2025-01-13 RX ADMIN — TRIAMCINOLONE ACETONIDE 80 MG: 40 INJECTION, SUSPENSION INTRA-ARTICULAR; INTRAMUSCULAR at 11:01

## 2025-01-13 NOTE — PROGRESS NOTES
Subjective:       Patient ID: Nikolai Brown is a 74 y.o. male.    Chief Complaint: Hypertension    Nikolai Brown is a 74 y.o. male with  HTN, HLD, Prediabetes, BPH  who presents today for Hypertension    Nikolai presents today for follow-up regarding blood pressure and back pain.    HYPERTENSION:  He is currently taking Olmesartan 60 mg (decided to try adding 20mg to his 40mg Olmesartan) for blood pressure management. Holding HCTZ as planned due to increased urination.  No chest pain, lightheadedness, palpitations, or changes in urine. Blood pressure , mostly controlled. Followed by digital medicine.     Labs with normal metabolic panel. Lipid profile is improved but not at goal. Reports with holidays and traveling his levels may be higher. Thinks he can improved levels with diet changes.    BACK PAIN:  He experiences back pain radiating from lower right back to buttock, occasionally extending down the leg. MRI from 12 years ago showed bulging disc at L3 and L4. He has had three significant pain relapses over the past 12 years. While able to exercise, he experiences increased discomfort at night when trying to relax. He occasionally uses Advil for pain management and has history of receiving injections. Had similar pain in the past which has been relieved with steroids. On his last visit had Solumedrol with partial help. Brings copied of a previous lumbar MRI noticing several bulging discs. Denies weakness or numbness. Pain radiates down his right leg. Pain has lasted longer than previously.    Otalgia   There is pain in both ears. This is a new problem. The current episode started 1 to 4 weeks ago. The problem has been gradually improving. The pain is moderate. Pertinent negatives include no coughing, diarrhea, drainage, ear discharge, headaches, hearing loss, neck pain, rash, rhinorrhea, sore throat or vomiting. He has tried NSAIDs for the symptoms. The treatment provided moderate relief. There is no  history of a chronic ear infection, hearing loss or a tympanostomy tube.     EAR SYMPTOMS:  He developed bilateral ear pain during air travel to [place]. Currently reports bilateral ear itching without pain, which he self-treats with alcohol on tissue with some relief.    GASTROINTESTINAL:  He reports excessive stomach noise after eating but denies reflux or acid symptoms. Coffee consumption is limited to one cup in morning and occasionally at night, significantly reduced from previous intake. Denies changes in stool, nausea, or pain.    LABS:  Liver enzymes have normalized from previous elevation. Triglycerides and LDL cholesterol have improved.    IMMUNIZATIONS:  He has received four COVID vaccines but declines pneumonia and shingles vaccines.      ROS:  General: -fever, -chills, -fatigue, -weight gain, -weight loss  Eyes: -vision changes, -redness, -discharge  ENT: -ear pain, -nasal congestion, -sore throat  Cardiovascular: -chest pain, -palpitations, -lower extremity edema  Respiratory: -cough, -shortness of breath  Gastrointestinal: -abdominal pain, -nausea, -vomiting, -diarrhea, -constipation, -blood in stool, -heartburn  Genitourinary: -dysuria, -hematuria, -frequency  Musculoskeletal: -joint pain, -muscle pain, +back pain  Skin: -rash, -lesion  Neurological: -headache, -dizziness, -numbness, -tingling  Psychiatric: -anxiety, -depression, -sleep difficulty             Past Medical History:   Diagnosis Date    BPH (benign prostatic hyperplasia)     HLD (hyperlipidemia)     HTN (hypertension)        History reviewed. No pertinent surgical history.    Family History   Problem Relation Name Age of Onset    Stroke Mother      Heart failure Mother      Hypertension Mother      Heart disease Mother      Rheum arthritis Mother      No Known Problems Sister 2     Cancer Brother 1         Lymphoma?    Hypertension Brother 1     Seizures Maternal Grandfather         Social History     Socioeconomic History    Marital  status: Unknown   Tobacco Use    Smoking status: Never    Smokeless tobacco: Never   Substance and Sexual Activity    Alcohol use: Yes     Alcohol/week: 5.0 standard drinks of alcohol     Types: 5 Cans of beer per week     Social Drivers of Health     Financial Resource Strain: Low Risk  (9/2/2024)    Overall Financial Resource Strain (CARDIA)     Difficulty of Paying Living Expenses: Not very hard   Food Insecurity: No Food Insecurity (9/2/2024)    Hunger Vital Sign     Worried About Running Out of Food in the Last Year: Never true     Ran Out of Food in the Last Year: Never true   Physical Activity: Sufficiently Active (9/2/2024)    Exercise Vital Sign     Days of Exercise per Week: 6 days     Minutes of Exercise per Session: 60 min   Stress: No Stress Concern Present (9/2/2024)    [employer] - Occupational Stress Questionnaire     Feeling of Stress : Not at all   Housing Stability: Unknown (9/2/2024)    Housing Stability Vital Sign     Unable to Pay for Housing in the Last Year: No       Current Outpatient Medications   Medication Sig Dispense Refill    ascorbic acid, vitamin C, (VITAMIN C) 1000 MG tablet Take 1,000 mg by mouth once daily.      finasteride (PROSCAR) 5 mg tablet Take 1 tablet (5 mg total) by mouth every evening. 30 tablet 0    magnesium 250 mg Tab Take by mouth once.      multivit with min-folic acid (CENTRUM ADULT 50 PLUS) 80 mcg Chew Take by mouth.      rosuvastatin (CRESTOR) 10 MG tablet Take 1 tablet (10 mg total) by mouth once daily. 90 tablet 3    acetic acid-hydrocortisone (VOSOL-HC) otic solution Place 3 drops into both ears 2 (two) times daily. for 7 days 10 mL 0    amLODIPine (NORVASC) 5 MG tablet Take 1 tablet (5 mg total) by mouth once daily. 90 tablet 3    loratadine (CLARITIN) 10 mg tablet Take 1 tablet (10 mg total) by mouth daily as needed for Allergies. 30 tablet 0    naproxen (NAPROSYN) 500 MG tablet Take 1 tablet (500 mg total) by mouth every 12  "(twelve) hours as needed (pain). 40 tablet 0    olmesartan (BENICAR) 40 MG tablet Take 1 tablet (40 mg total) by mouth once daily. 90 tablet 2    tiZANidine (ZANAFLEX) 4 MG tablet Take 1 tablet (4 mg total) by mouth 3 (three) times daily as needed (spasm). 30 tablet 0     No current facility-administered medications for this visit.       Review of patient's allergies indicates:  No Known Allergies      Objective:       Last 3 sets of Vitals        10/11/2024    11:19 AM 11/20/2024    11:07 AM 1/13/2025    10:23 AM   Vitals - 1 value per visit   SYSTOLIC 134 149 126   DIASTOLIC 82 90 82   Pulse 68 76 86   SPO2 98 % 97 % 98 %   Weight (lb) 182.21 169.2 185.96   Weight (kg) 82.65 76.75 84.35   Height 5' 6" (1.676 m)  5' 6" (1.676 m)   BMI (Calculated) 29.4  30   Pain Score Zero Seven Six   Physical Exam  Constitutional:       General: He is not in acute distress.  HENT:      Head: Normocephalic.      Right Ear: Tympanic membrane, ear canal and external ear normal.      Left Ear: Tympanic membrane, ear canal and external ear normal.      Ears:      Comments: Mild erythema of external canal.     Nose: Nose normal.      Mouth/Throat:      Mouth: Mucous membranes are moist.   Eyes:      General: No scleral icterus.     Extraocular Movements: Extraocular movements intact.      Conjunctiva/sclera: Conjunctivae normal.   Neck:      Vascular: No carotid bruit.      Comments: No goiter.  Cardiovascular:      Rate and Rhythm: Normal rate and regular rhythm.      Pulses: Normal pulses.      Heart sounds: Normal heart sounds.   Pulmonary:      Effort: Pulmonary effort is normal.      Breath sounds: Normal breath sounds.   Abdominal:      General: Bowel sounds are normal. There is no distension.      Palpations: Abdomen is soft. There is no mass.      Tenderness: There is no abdominal tenderness.   Musculoskeletal:         General: No swelling.      Comments: Discomfort to palpation on right buttock/posterior hip area.  Negative " straight leg or pain with hip rotation.   Lymphadenopathy:      Cervical: No cervical adenopathy.   Skin:     General: Skin is warm and dry.   Neurological:      General: No focal deficit present.      Mental Status: He is alert and oriented to person, place, and time.   Psychiatric:         Mood and Affect: Mood normal.         Behavior: Behavior normal.           CBC:  Recent Labs   Lab 10/07/24  0825   WBC 4.90   RBC 4.72   Hemoglobin 13.7 L   Hematocrit 42.4   Platelets 221   MCV 90   MCH 29.0   MCHC 32.3     CMP:  Recent Labs   Lab 10/07/24  0825 01/10/25  0752   Glucose 100 93   Calcium 9.9 9.8   Albumin 4.3 4.5   Total Protein 7.6 7.4   Sodium 141 142   Potassium 4.4 4.6   CO2 23 25   Chloride 108 106   BUN 19 21   Creatinine 1.2 1.2   Alkaline Phosphatase 62 56   ALT 50 H 42   AST 36 26   Total Bilirubin 0.7 0.6     URINALYSIS:       LIPIDS:  Recent Labs   Lab 10/07/24  0825 01/10/25  0752   HDL 47 47   Cholesterol 178 171   Triglycerides 198 H 185 H   LDL Cholesterol 91.4 87.0   HDL/Cholesterol Ratio 26.4 27.5   Non-HDL Cholesterol 131 124   Total Cholesterol/HDL Ratio 3.8 3.6     TSH:        A1C:  Recent Labs   Lab 10/07/24  0825   Hemoglobin A1C 5.5       Imaging:  No image results found.            Assessment:       1. Essential hypertension    2. Hyperlipidemia, unspecified hyperlipidemia type    3. Lumbar radiculopathy, chronic    4. Benign prostatic hyperplasia without lower urinary tract symptoms    5. Healthcare maintenance            Plan:       1. Essential hypertension  Overview:  On Olmesartan 40mg and now on amlodipine 5 mg daily .     Assessment & Plan:  - Initiated amlodipine 5 mg daily for blood pressure control.  - Changed Benicar to 40 mg daily (Max dose).  - Noted that the patient's blood pressure is stable, with a very good systolic reading (below 90) but a marginally high diastolic reading (82).  - Will focus on monitoring diet, exercise, and close observation rather than changing  medications at this point.      Orders:  -     olmesartan (BENICAR) 40 MG tablet; Take 1 tablet (40 mg total) by mouth once daily.  Dispense: 90 tablet; Refill: 2  -     amLODIPine (NORVASC) 5 MG tablet; Take 1 tablet (5 mg total) by mouth once daily.  Dispense: 90 tablet; Refill: 3    2. Hyperlipidemia, unspecified hyperlipidemia type  Overview:  On Crestor 10 mg daily.    Assessment & Plan:  - Continued rosuvastatin 10 mg daily for cholesterol management.  - Noted improvement in triglyceride levels and decrease in LDL cholesterol.  - Will continue observing cholesterol levels.      3. Lumbar radiculopathy, chronic  Assessment & Plan:  - Noted patient's report of ongoing back pain, particularly at night when relaxing, not interfering with daily activities but causing discomfort.  - Noted patient's report of pain running from lower right back to buttock, sometimes extending down to the ankle. Suspect sciatica but may have component of Sacroiliitis.  - Ordered MRI of lower back to re-evaluate L3-L4 bulging disc.  - Considered steroid injection as a potential treatment option.  - Initiated naproxen for back pain and tizanidine as needed for muscle relaxation, with caution due to somnolence.  - Referred to physical therapy for evaluation and treatment of lower back pain.      Orders:  -     naproxen (NAPROSYN) 500 MG tablet; Take 1 tablet (500 mg total) by mouth every 12 (twelve) hours as needed (pain).  Dispense: 40 tablet; Refill: 0  -     tiZANidine (ZANAFLEX) 4 MG tablet; Take 1 tablet (4 mg total) by mouth 3 (three) times daily as needed (spasm).  Dispense: 30 tablet; Refill: 0  -     MRI Lumbar Spine Without Contrast; Future; Expected date: 01/13/2025  -     Ambulatory Referral/Consult to Physical Therapy; Future; Expected date: 01/20/2025  -     triamcinolone acetonide injection 80 mg    4. Benign prostatic hyperplasia without lower urinary tract symptoms  Overview:  On finasteride 5mg daily.    Assessment &  Plan:  - Continued finasteride for prostate treatment.  - Noted that the patient reports no nocturia.      5. Healthcare maintenance  Assessment & Plan:  - Yearly labs- 10/7//24.  - Colon cancer screening- Javier on 1/10/23, negative.  - ACP- completed 8/26/24.  - Vaccination- Due for RSV, Shingles, Pneumonia, COVID booster- declines them today.      Other orders  -     loratadine (CLARITIN) 10 mg tablet; Take 1 tablet (10 mg total) by mouth daily as needed for Allergies.  Dispense: 30 tablet; Refill: 0  -     acetic acid-hydrocortisone (VOSOL-HC) otic solution; Place 3 drops into both ears 2 (two) times daily. for 7 days  Dispense: 10 mL; Refill: 0        EAR DISCOMFORT:  - Initiated Vosol HC otic drops, 3 drops in both ears twice daily for 1 week.  - Recommend OTC Claritin for allergies if needed.    DIGESTIVE DISCOMFORT:  - Noted patient's report of borborygmi after eating, causing discomfort.  - Evaluated that the patient has normal bowel movements and no reflux or acidity.  - Provided information on non-pharmacological approaches for digestive discomfort, including peppermint tea and IB kuldip, papaya enzyme supplements.  - Advised keeping a food diary to identify potential digestive triggers.    FOLLOW UP:  - Nikolai to continue current exercise regimen.  - Instructed the patient to contact the office if experiencing side effects from new medications or if back pain worsens.  - Obtain labs prior to next appointment.        Health Maintenance Due   Topic Date Due    Hepatitis C Screening  Never done    RSV Vaccine (Age 60+ and Pregnant patients) (1 - Risk 60-74 years 1-dose series) Never done        I spent a total of 46 minutes on the day of the visit.This includes face to face time and non-face to face time preparing to see the patient (eg, review of tests), obtaining and/or reviewing separately obtained history, documenting clinical information in the electronic or other health record, independently  interpreting results and communicating results to the patient/family/caregiver, or care coordinator.     RETURN TO CLINIC IN: 2-3 WEEKS    FOR NEXT VISIT: BLOOD PRESSURE MONITORING, MEDICATION MONITORING, and IMAGING       Kady Nowak MD  [facility]  Disclaimer:  This note has been generated using voice-recognition software. There may be grammatical or spelling errors that have been missed during proof-reading

## 2025-01-14 PROBLEM — M54.16 LUMBAR RADICULOPATHY, CHRONIC: Status: ACTIVE | Noted: 2025-01-14

## 2025-01-14 NOTE — ASSESSMENT & PLAN NOTE
- Noted patient's report of ongoing back pain, particularly at night when relaxing, not interfering with daily activities but causing discomfort.  - Noted patient's report of pain running from lower right back to buttock, sometimes extending down to the ankle. Suspect sciatica but may have component of Sacroiliitis.  - Ordered MRI of lower back to re-evaluate L3-L4 bulging disc.  - Considered steroid injection as a potential treatment option.  - Initiated naproxen for back pain and tizanidine as needed for muscle relaxation, with caution due to somnolence.  - Referred to physical therapy for evaluation and treatment of lower back pain.

## 2025-01-14 NOTE — ASSESSMENT & PLAN NOTE
- Continued rosuvastatin 10 mg daily for cholesterol management.  - Noted improvement in triglyceride levels and decrease in LDL cholesterol.  - Will continue observing cholesterol levels.

## 2025-01-14 NOTE — ASSESSMENT & PLAN NOTE
- Initiated amlodipine 5 mg daily for blood pressure control.  - Changed Benicar to 40 mg daily (Max dose).  - Noted that the patient's blood pressure is stable, with a very good systolic reading (below 90) but a marginally high diastolic reading (82).  - Will focus on monitoring diet, exercise, and close observation rather than changing medications at this point.

## 2025-01-14 NOTE — ASSESSMENT & PLAN NOTE
- Yearly labs- 10/7//24.  - Colon cancer screening- Cologuard on 1/10/23, negative.  - ACP- completed 8/26/24.  - Vaccination- Due for RSV, Shingles, Pneumonia, COVID booster- declines them today.

## 2025-01-17 ENCOUNTER — HOSPITAL ENCOUNTER (OUTPATIENT)
Dept: RADIOLOGY | Facility: HOSPITAL | Age: 75
Discharge: HOME | End: 2025-01-17
Attending: INTERNAL MEDICINE
Payer: MEDICARE

## 2025-01-17 DIAGNOSIS — M54.16 LUMBAR RADICULOPATHY, CHRONIC: ICD-10-CM

## 2025-01-17 PROCEDURE — 72148 MRI LUMBAR SPINE W/O DYE: CPT | Mod: 26,,, | Performed by: RADIOLOGY

## 2025-01-17 PROCEDURE — 72148 MRI LUMBAR SPINE W/O DYE: CPT | Mod: TC

## 2025-01-19 ENCOUNTER — PATIENT MESSAGE (OUTPATIENT)
Dept: PRIMARY CARE CLINIC | Facility: CLINIC | Age: 75
End: 2025-01-19
Payer: MEDICARE

## 2025-01-31 ENCOUNTER — CLINICAL SUPPORT (OUTPATIENT)
Dept: REHABILITATION | Facility: HOSPITAL | Age: 75
End: 2025-01-31
Attending: INTERNAL MEDICINE
Payer: MEDICARE

## 2025-01-31 DIAGNOSIS — M54.16 LUMBAR RADICULOPATHY, CHRONIC: ICD-10-CM

## 2025-01-31 PROCEDURE — 97162 PT EVAL MOD COMPLEX 30 MIN: CPT | Mod: HCNC,PN

## 2025-02-03 ENCOUNTER — OFFICE VISIT (OUTPATIENT)
Dept: PRIMARY CARE CLINIC | Facility: CLINIC | Age: 75
End: 2025-02-03
Payer: MEDICARE

## 2025-02-03 VITALS
SYSTOLIC BLOOD PRESSURE: 145 MMHG | RESPIRATION RATE: 18 BRPM | WEIGHT: 181.75 LBS | TEMPERATURE: 98 F | DIASTOLIC BLOOD PRESSURE: 80 MMHG | OXYGEN SATURATION: 98 % | HEART RATE: 67 BPM | HEIGHT: 66 IN | BODY MASS INDEX: 29.21 KG/M2

## 2025-02-03 DIAGNOSIS — H60.92 OTITIS EXTERNA OF LEFT EAR, UNSPECIFIED CHRONICITY, UNSPECIFIED TYPE: ICD-10-CM

## 2025-02-03 DIAGNOSIS — G89.29 CHRONIC RIGHT-SIDED LOW BACK PAIN WITH RIGHT-SIDED SCIATICA: ICD-10-CM

## 2025-02-03 DIAGNOSIS — Z00.00 HEALTHCARE MAINTENANCE: ICD-10-CM

## 2025-02-03 DIAGNOSIS — N40.0 BENIGN PROSTATIC HYPERPLASIA WITHOUT LOWER URINARY TRACT SYMPTOMS: ICD-10-CM

## 2025-02-03 DIAGNOSIS — M54.41 CHRONIC RIGHT-SIDED LOW BACK PAIN WITH RIGHT-SIDED SCIATICA: ICD-10-CM

## 2025-02-03 DIAGNOSIS — I10 ESSENTIAL HYPERTENSION: Primary | ICD-10-CM

## 2025-02-03 PROCEDURE — 1159F MED LIST DOCD IN RCRD: CPT | Mod: HCNC,CPTII,S$GLB, | Performed by: INTERNAL MEDICINE

## 2025-02-03 PROCEDURE — 1101F PT FALLS ASSESS-DOCD LE1/YR: CPT | Mod: HCNC,CPTII,S$GLB, | Performed by: INTERNAL MEDICINE

## 2025-02-03 PROCEDURE — 3079F DIAST BP 80-89 MM HG: CPT | Mod: HCNC,CPTII,S$GLB, | Performed by: INTERNAL MEDICINE

## 2025-02-03 PROCEDURE — 3288F FALL RISK ASSESSMENT DOCD: CPT | Mod: HCNC,CPTII,S$GLB, | Performed by: INTERNAL MEDICINE

## 2025-02-03 PROCEDURE — 1160F RVW MEDS BY RX/DR IN RCRD: CPT | Mod: HCNC,CPTII,S$GLB, | Performed by: INTERNAL MEDICINE

## 2025-02-03 PROCEDURE — 1126F AMNT PAIN NOTED NONE PRSNT: CPT | Mod: HCNC,CPTII,S$GLB, | Performed by: INTERNAL MEDICINE

## 2025-02-03 PROCEDURE — 4010F ACE/ARB THERAPY RXD/TAKEN: CPT | Mod: HCNC,CPTII,S$GLB, | Performed by: INTERNAL MEDICINE

## 2025-02-03 PROCEDURE — 1157F ADVNC CARE PLAN IN RCRD: CPT | Mod: HCNC,CPTII,S$GLB, | Performed by: INTERNAL MEDICINE

## 2025-02-03 PROCEDURE — 99999 PR PBB SHADOW E&M-EST. PATIENT-LVL IV: CPT | Mod: PBBFAC,HCNC,, | Performed by: INTERNAL MEDICINE

## 2025-02-03 PROCEDURE — 99214 OFFICE O/P EST MOD 30 MIN: CPT | Mod: HCNC,S$GLB,, | Performed by: INTERNAL MEDICINE

## 2025-02-03 PROCEDURE — 3008F BODY MASS INDEX DOCD: CPT | Mod: HCNC,CPTII,S$GLB, | Performed by: INTERNAL MEDICINE

## 2025-02-03 PROCEDURE — 3077F SYST BP >= 140 MM HG: CPT | Mod: HCNC,CPTII,S$GLB, | Performed by: INTERNAL MEDICINE

## 2025-02-03 RX ORDER — DOXAZOSIN 2 MG/1
2 TABLET ORAL NIGHTLY
Qty: 30 TABLET | Refills: 11 | Status: SHIPPED | OUTPATIENT
Start: 2025-02-03 | End: 2026-02-03

## 2025-02-03 RX ORDER — NEOMYCIN SULFATE, POLYMYXIN B SULFATE, HYDROCORTISONE 3.5; 10000; 1 MG/ML; [USP'U]/ML; MG/ML
3 SOLUTION/ DROPS AURICULAR (OTIC) 4 TIMES DAILY
COMMUNITY

## 2025-02-03 NOTE — PROGRESS NOTES
Subjective:       Patient ID: Nikolai Brown is a 74 y.o. male.    Chief Complaint: Follow-up (3 wks)      Nikolai Brown is a 74 y.o. male with HTN, HLD, Prediabetes, BPH who presents today for Follow-up (3 wks)    Reports back pain is better.  Went to physical therapy twice but noted other than 2 exercises tear rest of the exercises were the same has been doing at home.  Takes the medication only when pains significant.  Mostly takes naproxen that helps, with a steroid shot help the most.  Denies weakness or numbness.    MRI of the lumbar spine shows mild bulging disc and mild central canal stenosis and minimal degenerative changes.  Noted inflammatory changes at L4-L5 with facet joint effusions.    Blood pressure noted mildly elevated.  Did not take anti-inflammatories.  Finds amlodipine is causing urinary frequency.  Does not take tamsulosin as finasteride was helping.  Denies hesitancy or other obstructive symptoms.  Will change amlodipine to doxazosin and monitor.  His blood pressure tends to be better at home but noted add other provider offices his blood pressure is also elevated.  Has not validated his blood pressure machine but feels his reliable as is new.    Reports he was not able to afford eardrops and is using polymyxin combination otic solution with improvement of symptoms.        Review of Systems   Constitutional:  Negative for activity change and unexpected weight change.   HENT:  Negative for hearing loss, rhinorrhea and trouble swallowing.    Eyes:  Negative for discharge and visual disturbance.   Respiratory:  Negative for chest tightness and wheezing.    Cardiovascular:  Negative for chest pain and palpitations.   Gastrointestinal:  Negative for blood in stool, constipation, diarrhea and vomiting.   Endocrine: Negative for polydipsia and polyuria.   Genitourinary:  Negative for difficulty urinating, hematuria and urgency.   Musculoskeletal:  Negative for arthralgias, joint swelling and  neck pain.   Neurological:  Negative for weakness and headaches.   Psychiatric/Behavioral:  Negative for confusion and dysphoric mood.       Past Medical History:   Diagnosis Date    BPH (benign prostatic hyperplasia)     HLD (hyperlipidemia)     HTN (hypertension)        No past surgical history on file.    Family History   Problem Relation Name Age of Onset    Stroke Mother      Heart failure Mother      Hypertension Mother      Heart disease Mother      Rheum arthritis Mother      No Known Problems Sister 2     Cancer Brother 1         Lymphoma?    Hypertension Brother 1     Seizures Maternal Grandfather         Social History     Socioeconomic History    Marital status: Unknown   Tobacco Use    Smoking status: Never    Smokeless tobacco: Never   Substance and Sexual Activity    Alcohol use: Yes     Alcohol/week: 5.0 standard drinks of alcohol     Types: 5 Cans of beer per week     Social Drivers of Health     Financial Resource Strain: Low Risk  (9/2/2024)    Overall Financial Resource Strain (CARDIA)     Difficulty of Paying Living Expenses: Not very hard   Food Insecurity: No Food Insecurity (9/2/2024)    Hunger Vital Sign     Worried About Running Out of Food in the Last Year: Never true     Ran Out of Food in the Last Year: Never true   Physical Activity: Sufficiently Active (9/2/2024)    Exercise Vital Sign     Days of Exercise per Week: 6 days     Minutes of Exercise per Session: 60 min   Stress: No Stress Concern Present (9/2/2024)    [employer] [place] [employer] - Occupational Stress Questionnaire     Feeling of Stress : Not at all   Housing Stability: Unknown (9/2/2024)    Housing Stability Vital Sign     Unable to Pay for Housing in the Last Year: No       Current Outpatient Medications   Medication Sig Dispense Refill    ascorbic acid, vitamin C, (VITAMIN C) 1000 MG tablet Take 1,000 mg by mouth once daily.      loratadine (CLARITIN) 10 mg tablet Take 1 tablet (10 mg total) by mouth daily  "as needed for Allergies. 30 tablet 0    magnesium 250 mg Tab Take by mouth once.      multivit with min-folic acid (CENTRUM ADULT 50 PLUS) 80 mcg Chew Take by mouth.      naproxen (NAPROSYN) 500 MG tablet Take 1 tablet (500 mg total) by mouth every 12 (twelve) hours as needed (pain). 40 tablet 0    olmesartan (BENICAR) 40 MG tablet Take 1 tablet (40 mg total) by mouth once daily. 90 tablet 2    rosuvastatin (CRESTOR) 10 MG tablet Take 1 tablet (10 mg total) by mouth once daily. 90 tablet 3    doxazosin (CARDURA) 2 MG tablet Take 1 tablet (2 mg total) by mouth every evening. 30 tablet 11    finasteride (PROSCAR) 5 mg tablet Take 1 tablet (5 mg total) by mouth every evening. 30 tablet 0    neomycin-polymyxin-hydrocortisone (CORTISPORIN) otic solution Place 3 drops into the left ear 4 (four) times daily.       No current facility-administered medications for this visit.       Review of patient's allergies indicates:  No Known Allergies      Objective:       Last 3 sets of Vitals        11/20/2024    11:07 AM 1/13/2025    10:23 AM 2/3/2025     9:44 AM   Vitals - 1 value per visit   SYSTOLIC 149 126 145   DIASTOLIC 90 82 80   Pulse 76 86 67   Temp   98.3 °F (36.8 °C)   Resp   18   SPO2 97 % 98 % 98 %   Weight (lb) 169.2 185.96 181.77   Weight (kg) 76.75 84.35 82.45   Height  5' 6" (1.676 m) 5' 6" (1.676 m)   BMI (Calculated)  30 29.4   Pain Score Seven Six Zero   Physical Exam  Constitutional:       General: He is not in acute distress.  HENT:      Head: Normocephalic.      Right Ear: Tympanic membrane, ear canal and external ear normal.      Left Ear: Tympanic membrane and external ear normal.      Ears:      Comments: Mild erythema of left external canal     Nose: Nose normal.      Mouth/Throat:      Mouth: Mucous membranes are moist.   Eyes:      General: No scleral icterus.     Extraocular Movements: Extraocular movements intact.      Conjunctiva/sclera: Conjunctivae normal.   Neck:      Vascular: No carotid bruit. "      Comments: No goiter.  Cardiovascular:      Rate and Rhythm: Normal rate and regular rhythm.      Pulses: Normal pulses.      Heart sounds: Normal heart sounds.   Pulmonary:      Effort: Pulmonary effort is normal.      Breath sounds: Normal breath sounds.   Abdominal:      General: Bowel sounds are normal. There is no distension.      Palpations: Abdomen is soft. There is no mass.      Tenderness: There is no abdominal tenderness.   Musculoskeletal:         General: No swelling.   Lymphadenopathy:      Cervical: No cervical adenopathy.   Skin:     General: Skin is warm and dry.   Neurological:      General: No focal deficit present.      Mental Status: He is alert and oriented to person, place, and time.   Psychiatric:         Mood and Affect: Mood normal.         Behavior: Behavior normal.           CBC:  Recent Labs   Lab 10/07/24  0825   WBC 4.90   RBC 4.72   Hemoglobin 13.7 L   Hematocrit 42.4   Platelets 221   MCV 90   MCH 29.0   MCHC 32.3     CMP:  Recent Labs   Lab 10/07/24  0825 01/10/25  0752   Glucose 100 93   Calcium 9.9 9.8   Albumin 4.3 4.5   Total Protein 7.6 7.4   Sodium 141 142   Potassium 4.4 4.6   CO2 23 25   Chloride 108 106   BUN 19 21   Creatinine 1.2 1.2   Alkaline Phosphatase 62 56   ALT 50 H 42   AST 36 26   Total Bilirubin 0.7 0.6     URINALYSIS:       LIPIDS:  Recent Labs   Lab 10/07/24  0825 01/10/25  0752   HDL 47 47   Cholesterol 178 171   Triglycerides 198 H 185 H   LDL Cholesterol 91.4 87.0   HDL/Cholesterol Ratio 26.4 27.5   Non-HDL Cholesterol 131 124   Total Cholesterol/HDL Ratio 3.8 3.6     TSH:        A1C:  Recent Labs   Lab 10/07/24  0825   Hemoglobin A1C 5.5       Imaging:  MRI Lumbar Spine Without Contrast  Narrative: EXAMINATION:  MRI LUMBAR SPINE WITHOUT CONTRAST    CLINICAL HISTORY:  Radiculopathy, lumbar regionLumbar radiculopathy, symptoms persist with conservative treatment;    TECHNIQUE:  Sagittal T1, sagittal T2, sagittal STIR, axial T1 and axial T2 weighted images  of the lumbar spine obtained without contrast.    COMPARISON:  None    FINDINGS:  Lumbar spine alignment is within normal limits. The vertebral body heights are well maintained, with no fracture.  Enthesopathic edema of the anterior superior corners of L4 and L3.  Marrow signal otherwise normal.    The conus is normal in appearance.  The adjacent soft tissue structures show no significant abnormalities.    There is multilevel lumbar disc desiccation.  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    L1-L2: There is no focal disc herniation. No significant central canal or neural foraminal narrowing.  Perineural cyst in the left neural foramen.    L2-L3: Circumferential disc bulge.  Mild central canal stenosis.    L3-L4: Circumferential disc bulging.  Mild central canal stenosis.    L4-L5: Circumferential disc bulge and bilateral facet joint effusions.  No significant central canal or neural foraminal narrowing.    L5-S1: Circumferential disc bulge.  No significant central canal or neural foraminal narrowing.  Impression: Minimal degenerative changes with mild central canal stenosis in the mid lumbar region mainly on the basis of short pedicles.    Electronically signed by: Ezekiel Crowder Jr  Date:    01/17/2025  Time:    16:27      Assessment:       1. Essential hypertension    2. Benign prostatic hyperplasia without lower urinary tract symptoms    3. Chronic right-sided low back pain with right-sided sciatica    4. Otitis externa of left ear, unspecified chronicity, unspecified type    5. Healthcare maintenance            Plan:       1. Essential hypertension  Overview:  On Olmesartan 40mg and now on amlodipine 5 mg daily .     Assessment & Plan:  Reports noticing increased frequency with amlodipine.  Not interested in diuretics.    Will try doxazosin to help with blood pressure and any possible BPH symptoms.  Monitor blood pressure at home but advised to bring his device to  validate at the office.      Orders:  -     doxazosin (CARDURA) 2 MG tablet; Take 1 tablet (2 mg total) by mouth every evening.  Dispense: 30 tablet; Refill: 11    2. Benign prostatic hyperplasia without lower urinary tract symptoms  Overview:  On finasteride 5mg daily.    Assessment & Plan:  Reports no obstructive symptoms with finasteride but increased urination since on amlodipine.  Will try doxazosin to help with any obstructive symptoms.    Orders:  -     doxazosin (CARDURA) 2 MG tablet; Take 1 tablet (2 mg total) by mouth every evening.  Dispense: 30 tablet; Refill: 11    3. Chronic right-sided low back pain with right-sided sciatica  Assessment & Plan:  Feels steroid injection help the most.  Taking naproxen and sometimes muscle relaxer when pain is significantly increased.  Noted MRI with DJD, mild stenosis changes, and inflammatory changes at L4-L5.    Continue anti-inflammatories as needed.    Recommend to complete therapy.      4. Otitis externa of left ear, unspecified chronicity, unspecified type  Comments:  Was not able to get prescribed VoSol HC.  Okay to continue polymyxin combination of the solution for 7 days.    5. Healthcare maintenance  Assessment & Plan:  - Yearly labs- 10/7//24.  - Colon cancer screening- Cologuard on 1/10/23, negative.  - ACP- completed 8/26/24.  - Vaccination- Due for RSV, Shingles, Pneumonia, COVID booster- declines them today.         Health Maintenance Due   Topic Date Due    Hepatitis C Screening  Never done    RSV Vaccine (Age 60+ and Pregnant patients) (1 - Risk 60-74 years 1-dose series) Never done        I spent a total of 30 minutes on the day of the visit.This includes face to face time and non-face to face time preparing to see the patient (eg, review of tests), obtaining and/or reviewing separately obtained history, documenting clinical information in the electronic or other health record, independently interpreting results and communicating results to the  patient/family/caregiver, or care coordinator.     RETURN TO CLINIC IN:  In 2 weeks for BP check and evaluation device.  Follow with me in 1 month.    FOR NEXT VISIT: MEDICATION MONITORING and CARE GAPS       Kady Nowak MD  [facility]  Disclaimer:  This note has been generated using voice-recognition software. There may be grammatical or spelling errors that have been missed during proof-reading

## 2025-02-04 NOTE — ASSESSMENT & PLAN NOTE
Reports noticing increased frequency with amlodipine.  Not interested in diuretics.    Will try doxazosin to help with blood pressure and any possible BPH symptoms.  Monitor blood pressure at home but advised to bring his device to validate at the office.

## 2025-02-04 NOTE — ASSESSMENT & PLAN NOTE
Reports no obstructive symptoms with finasteride but increased urination since on amlodipine.  Will try doxazosin to help with any obstructive symptoms.

## 2025-02-04 NOTE — ASSESSMENT & PLAN NOTE
Feels steroid injection help the most.  Taking naproxen and sometimes muscle relaxer when pain is significantly increased.  Noted MRI with DJD, mild stenosis changes, and inflammatory changes at L4-L5.    Continue anti-inflammatories as needed.    Recommend to complete therapy.

## 2025-02-17 NOTE — PROGRESS NOTES
Outpatient Rehab    Physical Therapy Evaluation    Patient Name: Nikolai Brown  MRN: [id number]  YOB: 1950  Today's Date: 2/17/2025    Therapy Diagnosis:   Encounter Diagnosis   Name Primary?    Lumbar radiculopathy, chronic      Physician: Kady Nowak MD    Physician Orders: Eval and Treat  Medical Diagnosis: M54.16 (ICD-10-CM) - Lumbar radiculopathy, chronic     Visit # / Visits Authorized:  1 / 1   Date of Evaluation:  1/31/2025   Insurance Authorization Period: 2/6/2025 to 12/31/2025  Plan of Care Certification:  1/31/2025 to 3/14/2025      Time In: 0805   Time Out: 0900  Total Time: 55   Total Billable Time: 55    Intake Outcome Measure for FOTO Survey    Therapist reviewed FOTO scores for Nikolai Brown on 1/31/2025.   FOTO report - see Media section or FOTO account episode details.     Intake Score:  %         Subjective   History of Present Illness  Nikolai is a 74 y.o. male who reports to physical therapy with a chief concern of Right sided low back pain. According to the patient's chart, Nikolai has a past medical history of BPH (benign prostatic hyperplasia), HLD (hyperlipidemia), and HTN (hypertension). Nikolai has no past surgical history on file.    The patient reports a medical diagnosis of Lumbar radiculopathy, chronic.    Diagnostic tests related to this condition: MRI studies.        Dominant Hand: Right  History of Present Condition/Illness: Patient reports he has been having low back pain for about 10 years off and on. About 2 months ago his low back began having increased pain radiating into his right lower extremity. Pain will typically radiate into his right buttocks and sometimes down to his right foot. Aggravating factors include laying down at the end of the day, and patient has difficulty attributing any other aggravating factors. Recent IRA, which helped with pain.     Pain     Patient reports a current pain level of 4/10.  Pain at worst is reported as 10/10.    Location: right sided low back  Clinical Progression (since onset): Stable  Pain Qualities: Discomfort, Aching, Tightness  Pain-Relieving Factors: Activity modification, Movement  Pain-Aggravating Factors: Bending, Lifting, Lying down           Past Medical History/Physical Systems Review:   Nikolai Brown  has a past medical history of BPH (benign prostatic hyperplasia), HLD (hyperlipidemia), and HTN (hypertension).    Nikolai Brown  has no past surgical history on file.    Nikolai has a current medication list which includes the following prescription(s): ascorbic acid (vitamin c), doxazosin, finasteride, loratadine, magnesium, centrum adult 50 plus, naproxen, neomycin-polymyxin-hydrocortisone, olmesartan, and rosuvastatin.    Review of patient's allergies indicates:  No Known Allergies     Objective      Lumbar Range of Motion   Active (deg) Passive (deg) Pain   Flexion 60       Extension 15       Right Lateral Flexion         Right Rotation         Left Lateral Flexion         Left Rotation                  Hip Range of Motion   Right Hip   Active (deg) Passive (deg) Pain   Flexion         Extension         ABduction         ADduction         External Rotation 90/90 30       External Rotation Prone         Internal Rotation 90/90 25       Internal Rotation Prone             Left Hip   Active (deg) Passive (deg) Pain   Flexion         Extension         ABduction         ADduction         External Rotation 90/90 25       External Rotation Prone         Internal Rotation 90/90 30       Internal Rotation Prone                            Hip Strength - Planes of Motion   Right Strength Right Pain Left Strength Left  Pain   Flexion (L2) 5   5     Extension 4   4     ABduction 4   4     ADduction 5   5     Internal Rotation 4   4     External Rotation 4+   4+         Knee Strength   Right Strength Right Pain Left Strength Left  Pain   Flexion (S2) 5   5     Prone Flexion 4+   4+     Extension (L3) 5   5                    Treatment:   therapeutic activities to improve functional performance for 15 minutes, including:  Patient education on plan of care, prognosis, and home exercise program     Assessment & Plan   Assessment  Nikolai presents with a condition of Moderate complexity.   Presentation of Symptoms: Stable       Functional Limitations: Activity tolerance, Carrying objects, Pain with ADLs/IADLs  Impairments: Impaired physical strength, Activity intolerance, Pain with functional activity    Prognosis: Excellent  Assessment Details: Patient has signs and symptoms presenting as low back pain due to chronic disc bulges and core instability. Recent IRA with relief and reports of performing some home exercises. Patient also presents with core instability and gluteus medius/anna weakness, tightness of hip flexors, lumbar erectors, and piriformis muscles, and functional limitations of prolonged positioning and dynamic activities. Patient would benefit from skilled PT focusing on regaining pain-free active lumbar range of motion along with core stabilization training.       Patient's spiritual, cultural, and educational needs considered and patient agreeable to plan of care and goals.           Goals:   Active       Lifting & carrying objects       Patient will lift 10 pounds        Start:  02/17/25    Expected End:  03/31/25               Pain       Patient will report pain of 0/10 demonstrating a reduction of overall pain       Start:  02/17/25    Expected End:  03/31/25               Range of Motion       Patient will achieve bilateral hip internal rotation ROM 45 degrees in neutral       Start:  02/17/25    Expected End:  03/31/25            Patient will achieve bilateral hip external rotation ROM 45 degrees in neutral       Start:  02/17/25    Expected End:  03/31/25                Solo Alicea, PT

## 2025-02-19 ENCOUNTER — CLINICAL SUPPORT (OUTPATIENT)
Dept: PRIMARY CARE CLINIC | Facility: CLINIC | Age: 75
End: 2025-02-19
Payer: MEDICARE

## 2025-02-19 VITALS
WEIGHT: 183.31 LBS | RESPIRATION RATE: 18 BRPM | OXYGEN SATURATION: 98 % | HEIGHT: 66 IN | BODY MASS INDEX: 29.46 KG/M2 | SYSTOLIC BLOOD PRESSURE: 118 MMHG | HEART RATE: 62 BPM | DIASTOLIC BLOOD PRESSURE: 70 MMHG

## 2025-02-19 DIAGNOSIS — I10 ESSENTIAL HYPERTENSION: Primary | ICD-10-CM

## 2025-02-19 NOTE — PROGRESS NOTES
Nikolai Brown 74 y.o. male is here today for Blood Pressure check.   History of HTN yes.    Review of patient's allergies indicates:  No Known Allergies  Creatinine   Date Value Ref Range Status   01/10/2025 1.2 0.5 - 1.4 mg/dL Final     Sodium   Date Value Ref Range Status   01/10/2025 142 136 - 145 mmol/L Final     Potassium   Date Value Ref Range Status   01/10/2025 4.6 3.5 - 5.1 mmol/L Final   ]  Patient verifies taking blood pressure medications on a regular basis at the same time of the day.     Does patient have record of home blood pressure readings no.     Patient is asymptomatic.       BP: 118/70 , Pulse: 62 .

## 2025-02-22 DIAGNOSIS — Z00.00 ENCOUNTER FOR MEDICARE ANNUAL WELLNESS EXAM: ICD-10-CM

## 2025-03-17 ENCOUNTER — DOCUMENTATION ONLY (OUTPATIENT)
Dept: PRIMARY CARE CLINIC | Facility: CLINIC | Age: 75
End: 2025-03-17
Payer: MEDICARE

## 2025-04-04 ENCOUNTER — OFFICE VISIT (OUTPATIENT)
Dept: PRIMARY CARE CLINIC | Facility: CLINIC | Age: 75
End: 2025-04-04
Payer: MEDICARE

## 2025-04-04 VITALS
SYSTOLIC BLOOD PRESSURE: 132 MMHG | BODY MASS INDEX: 29.42 KG/M2 | OXYGEN SATURATION: 98 % | HEIGHT: 66 IN | DIASTOLIC BLOOD PRESSURE: 86 MMHG | HEART RATE: 67 BPM | TEMPERATURE: 98 F | WEIGHT: 183.06 LBS

## 2025-04-04 DIAGNOSIS — Z87.09 HISTORY OF ASTHMA: ICD-10-CM

## 2025-04-04 DIAGNOSIS — J06.9 URTI (ACUTE UPPER RESPIRATORY INFECTION): Primary | ICD-10-CM

## 2025-04-04 DIAGNOSIS — I10 ESSENTIAL HYPERTENSION: ICD-10-CM

## 2025-04-04 LAB
CTP QC/QA: YES
CTP QC/QA: YES
FLUAV AG NPH QL: NEGATIVE
FLUBV AG NPH QL: NEGATIVE
SARS-COV-2 RDRP RESP QL NAA+PROBE: NEGATIVE

## 2025-04-04 PROCEDURE — 99999 PR PBB SHADOW E&M-EST. PATIENT-LVL III: CPT | Mod: PBBFAC,HCNC,, | Performed by: INTERNAL MEDICINE

## 2025-04-04 RX ORDER — TRIAMCINOLONE ACETONIDE 40 MG/ML
40 INJECTION, SUSPENSION INTRA-ARTICULAR; INTRAMUSCULAR ONCE
Status: COMPLETED | OUTPATIENT
Start: 2025-04-04 | End: 2025-04-04

## 2025-04-04 RX ORDER — PROMETHAZINE HYDROCHLORIDE AND DEXTROMETHORPHAN HYDROBROMIDE 6.25; 15 MG/5ML; MG/5ML
5 SYRUP ORAL EVERY 8 HOURS PRN
Qty: 120 ML | Refills: 0 | Status: SHIPPED | OUTPATIENT
Start: 2025-04-04 | End: 2025-04-14

## 2025-04-04 RX ORDER — ALBUTEROL SULFATE 90 UG/1
2 INHALANT RESPIRATORY (INHALATION) EVERY 6 HOURS PRN
Qty: 18 G | Refills: 3 | Status: SHIPPED | OUTPATIENT
Start: 2025-04-04

## 2025-04-04 RX ADMIN — TRIAMCINOLONE ACETONIDE 40 MG: 40 INJECTION, SUSPENSION INTRA-ARTICULAR; INTRAMUSCULAR at 05:04

## 2025-04-04 NOTE — PROGRESS NOTES
"Patient presented today for a clinic visit.  RN requested to give Triamcinolone Acetonide 40 mg in jection. All information including allergies, falls, medical hx, and medications verified. Vital signs obtained. Order for Triamcinolone Acetonide injection received from Dr Nowak. Injection given using aseptic technique in the .R Ventrogluteal with a 25g 1" needle. Patient instructed to wait 15 minutes for possible side effects.        Call your doctor if:  Joint pain or swelling.  Muscle pain or weakness.  Feeling tired or weak.  Fast, slow, or abnormal heartbeat.  Chest pain  Signs of high blood sugar like confusion, feeling sleepy, unusual thirst or hunger, passing urine more often, flushing, fast breathing, or breath that smells like fruit.  Signs of high blood pressure like very bad headache or dizziness, passing out, or change in eyesight.  Trouble sleeping.       "

## 2025-04-04 NOTE — PROGRESS NOTES
Subjective:       Patient ID: Nikolai Brown is a 74 y.o. male.    Chief Complaint: Cough and Sore Throat    Nikolai Brown is a 74 y.o. male with HTN, HLD, Prediabetes, BPH  who presents today for Cough and Sore Throat    Nikolai presents today for cough that is particularly bothersome at night    He developed a cough 4-5 days ago, initially dry which has progressed to include small amounts of phlegm. He experienced shortness of breath and wheezing needing to use the inhaler. He denies fever, sore throat, chest pain, nasal congestion, or ear problems. He recently returned from a 21-day trip to [place] and [facility] at 2 AM on Monday.    Yesterday checked for COVID and flu with negative test results.    Cough  This is a new problem. The current episode started in the past 7 days. The problem has been gradually worsening. The problem occurs every few hours. The cough is Productive of sputum. Associated symptoms include rhinorrhea, a sore throat, shortness of breath and wheezing. Pertinent negatives include no chest pain, chills, ear congestion, ear pain, fever, headaches, heartburn, hemoptysis, myalgias, nasal congestion, postnasal drip, sweats or weight loss. The symptoms are aggravated by lying down. Risk factors for lung disease include travel. He has tried OTC cough suppressant for the symptoms. The treatment provided mild relief. His past medical history is significant for asthma. There is no history of bronchiectasis, bronchitis, COPD, emphysema or environmental allergies.     CURRENT MEDICATIONS:  He has been taking DayQuil and NightQuil for symptom relief, as well as aspirin and honey with lemon.    MEDICAL HISTORY:  He has a history of asthma from many years ago. He denies any history of reflux or acidity.           Review of Systems   Constitutional:  Negative for chills, fever and weight loss.   HENT:  Positive for rhinorrhea and sore throat. Negative for ear pain and postnasal drip.    Respiratory:   Positive for cough, shortness of breath and wheezing. Negative for hemoptysis.    Cardiovascular:  Negative for chest pain.   Gastrointestinal:  Negative for heartburn.   Musculoskeletal:  Negative for myalgias.   Allergic/Immunologic: Negative for environmental allergies.   Neurological:  Negative for headaches.      Past Medical History:   Diagnosis Date    BPH (benign prostatic hyperplasia)     HLD (hyperlipidemia)     HTN (hypertension)        History reviewed. No pertinent surgical history.    Family History   Problem Relation Name Age of Onset    Stroke Mother      Heart failure Mother      Hypertension Mother      Heart disease Mother      Rheum arthritis Mother      No Known Problems Sister 2     Cancer Brother 1         Lymphoma?    Hypertension Brother 1     Seizures Maternal Grandfather         Social History     Socioeconomic History    Marital status: Unknown   Tobacco Use    Smoking status: Never    Smokeless tobacco: Never   Substance and Sexual Activity    Alcohol use: Yes     Alcohol/week: 5.0 standard drinks of alcohol     Types: 5 Cans of beer per week     Social Drivers of Health     Financial Resource Strain: Medium Risk (2/12/2025)    Overall Financial Resource Strain (CARDIA)     Difficulty of Paying Living Expenses: Somewhat hard   Food Insecurity: Food Insecurity Present (2/12/2025)    Hunger Vital Sign     Worried About Running Out of Food in the Last Year: Sometimes true     Ran Out of Food in the Last Year: Never true   Transportation Needs: No Transportation Needs (2/12/2025)    PRAPARE - Transportation     Lack of Transportation (Medical): No     Lack of Transportation (Non-Medical): No   Physical Activity: Sufficiently Active (2/12/2025)    Exercise Vital Sign     Days of Exercise per Week: 6 days     Minutes of Exercise per Session: 90 min   Stress: No Stress Concern Present (2/12/2025)    [employer] - Occupational Stress Questionnaire     Feeling of  "Stress : Not at all   Housing Stability: Low Risk  (2/12/2025)    Housing Stability Vital Sign     Unable to Pay for Housing in the Last Year: No     Homeless in the Last Year: No       Current Medications[1]    Review of patient's allergies indicates:  No Known Allergies      Objective:       Last 3 sets of Vitals        2/3/2025     9:44 AM 2/19/2025    10:00 AM 4/4/2025     4:08 PM   Vitals - 1 value per visit   SYSTOLIC 145 118 132   DIASTOLIC 80 70 86   Pulse 67 62 67   Temp 98.3 °F (36.8 °C)  97.8 °F (36.6 °C)   Resp 18 18    SPO2 98 % 98 % 98 %   Weight (lb) 181.77 183.31 183.09   Weight (kg) 82.45 83.15 83.05   Height 5' 6" (1.676 m) 5' 6" (1.676 m) 5' 6" (1.676 m)   BMI (Calculated) 29.4 29.6 29.6   Pain Score Zero Zero Four   Physical Exam  Constitutional:       General: He is not in acute distress.     Appearance: Normal appearance.   HENT:      Mouth/Throat:      Pharynx: Posterior oropharyngeal erythema (mild) present.   Eyes:      General: No scleral icterus.     Extraocular Movements: Extraocular movements intact.      Conjunctiva/sclera: Conjunctivae normal.   Neck:      Vascular: No carotid bruit.      Comments: No goiter.  Cardiovascular:      Rate and Rhythm: Normal rate and regular rhythm.      Pulses: Normal pulses.      Heart sounds: Normal heart sounds.   Pulmonary:      Effort: Pulmonary effort is normal.      Breath sounds: Normal breath sounds. No wheezing.   Abdominal:      General: Bowel sounds are normal. There is no distension.      Palpations: Abdomen is soft.      Tenderness: There is no abdominal tenderness.   Musculoskeletal:         General: No swelling. Normal range of motion.   Lymphadenopathy:      Cervical: No cervical adenopathy.   Skin:     General: Skin is warm and dry.   Neurological:      General: No focal deficit present.      Mental Status: He is alert and oriented to person, place, and time.   Psychiatric:         Mood and Affect: Mood normal.         Behavior: Behavior " normal.           CBC:  Recent Labs   Lab 10/07/24  0825   WBC 4.90   RBC 4.72   Hemoglobin 13.7 L   Hematocrit 42.4   Platelets 221   MCV 90   MCH 29.0   MCHC 32.3     CMP:  Recent Labs   Lab 10/07/24  0825 01/10/25  0752   Glucose 100 93   Calcium 9.9 9.8   Albumin 4.3 4.5   Total Protein 7.6 7.4   Sodium 141 142   Potassium 4.4 4.6   CO2 23 25   Chloride 108 106   BUN 19 21   Creatinine 1.2 1.2   Alkaline Phosphatase 62 56   ALT 50 H 42   AST 36 26   Total Bilirubin 0.7 0.6     URINALYSIS:       LIPIDS:  Recent Labs   Lab 10/07/24  0825 01/10/25  0752   HDL 47 47   Cholesterol 178 171   Triglycerides 198 H 185 H   LDL Cholesterol 91.4 87.0   HDL/Cholesterol Ratio 26.4 27.5   Non-HDL Cholesterol 131 124   Total Cholesterol/HDL Ratio 3.8 3.6     TSH:        A1C:  Recent Labs   Lab 10/07/24  0825   Hemoglobin A1C 5.5       Imaging:  MRI Lumbar Spine Without Contrast  Narrative: EXAMINATION:  MRI LUMBAR SPINE WITHOUT CONTRAST    CLINICAL HISTORY:  Radiculopathy, lumbar regionLumbar radiculopathy, symptoms persist with conservative treatment;    TECHNIQUE:  Sagittal T1, sagittal T2, sagittal STIR, axial T1 and axial T2 weighted images of the lumbar spine obtained without contrast.    COMPARISON:  None    FINDINGS:  Lumbar spine alignment is within normal limits. The vertebral body heights are well maintained, with no fracture.  Enthesopathic edema of the anterior superior corners of L4 and L3.  Marrow signal otherwise normal.    The conus is normal in appearance.  The adjacent soft tissue structures show no significant abnormalities.    There is multilevel lumbar disc desiccation.  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    L1-L2: There is no focal disc herniation. No significant central canal or neural foraminal narrowing.  Perineural cyst in the left neural foramen.    L2-L3: Circumferential disc bulge.  Mild central canal stenosis.    L3-L4: Circumferential disc bulging.   Mild central canal stenosis.    L4-L5: Circumferential disc bulge and bilateral facet joint effusions.  No significant central canal or neural foraminal narrowing.    L5-S1: Circumferential disc bulge.  No significant central canal or neural foraminal narrowing.  Impression: Minimal degenerative changes with mild central canal stenosis in the mid lumbar region mainly on the basis of short pedicles.    Electronically signed by: Ezekiel Crowder Jr  Date:    01/17/2025  Time:    16:27      Assessment:       1. URTI (acute upper respiratory infection)    2. History of asthma    3. Essential hypertension            Plan:       1. URTI (acute upper respiratory infection)  -     triamcinolone acetonide injection 40 mg  -     POCT COVID-19 Rapid Screening  -     POCT Influenza A/B Rapid Antigen    2. History of asthma  Assessment & Plan:  Continue rescue inhaler.  Kenalog Injection ordered as points persistent symptoms despite treatment of flu-like symptoms.      3. Essential hypertension  Overview:  On Olmesartan 40mg and now on amlodipine 5 mg daily .     Assessment & Plan:  Controlled blood pressure.  Continue to monitor with same treatment.      Other orders  -     promethazine-dextromethorphan (PROMETHAZINE-DM) 6.25-15 mg/5 mL Syrp; Take 5 mLs by mouth every 8 (eight) hours as needed (cough).  Dispense: 120 mL; Refill: 0  -     albuterol (VENTOLIN HFA) 90 mcg/actuation inhaler; Inhale 2 puffs into the lungs every 6 (six) hours as needed for Wheezing. Rescue  Dispense: 18 g; Refill: 3        ACUTE COUGH:  - Prescribed promethazine syrup for nighttime cough management.  - Advised continuation of OTC medications, including DayQuil, for symptom management.  - Recommend OTC options for nighttime use: Claritin or Mucinex.  - Assessed that the cough might be due to recent travel and environmental change.  - Determined that antibiotics are not indicated at this time, suspecting a viral etiology.  - Noted that the patient has  been taking OTC Nyquil for day and night, and aspirin.      EXPOSURE TO VIRAL COMMUNICABLE DISEASES:  - Noted that the patient recently returned from a 21-day international trip to [place] and [place].  - Documented that the patient performed home COVID-19, influenza A, and influenza B tests, all of which were negative.  - Considered the possibility of exposure to pathogens during the extended flight.    GENERAL MANAGEMENT AND FOLLOW-UP:  - Educated the patient about potential side effects including nervousness, increased energy, hot flashes, emotional changes, and possible impacts on glucose and blood pressure.  - Instructed the patient to contact the office if symptoms do not improve in a few days or worsen.  - Advised the patient to monitor symptoms and return if condition deteriorates or does not improve in a few days.        Health Maintenance Due   Topic Date Due    Hepatitis C Screening  Never done    RSV Vaccine (Age 60+ and Pregnant patients) (1 - Risk 60-74 years 1-dose series) Never done        I spent a total of 30 minutes on the day of the visit.This includes face to face time and non-face to face time preparing to see the patient (eg, review of tests), obtaining and/or reviewing separately obtained history, documenting clinical information in the electronic or other health record, independently interpreting results and communicating results to the patient/family/caregiver, or care coordinator.     RETURN TO CLINIC IN:  As scheduled or earlier if needed    FOR NEXT VISIT: MEDICATION MONITORING       Kady Nowak MD  [facility]  Disclaimer:  This note has been generated using voice-recognition software. There may be grammatical or spelling errors that have been missed during proof-reading           [1]   Current Outpatient Medications   Medication Sig Dispense Refill    ascorbic acid, vitamin C, (VITAMIN C) 1000 MG tablet Take 1,000 mg by mouth once daily.      doxazosin (CARDURA) 2 MG tablet  Take 1 tablet (2 mg total) by mouth every evening. 30 tablet 11    finasteride (PROSCAR) 5 mg tablet Take 1 tablet (5 mg total) by mouth every evening. 30 tablet 3    loratadine (CLARITIN) 10 mg tablet Take 1 tablet (10 mg total) by mouth daily as needed for Allergies. 30 tablet 0    magnesium 250 mg Tab Take by mouth once.      multivit with min-folic acid (CENTRUM ADULT 50 PLUS) 80 mcg Chew Take by mouth.      naproxen (NAPROSYN) 500 MG tablet Take 1 tablet (500 mg total) by mouth every 12 (twelve) hours as needed (pain). 40 tablet 0    olmesartan (BENICAR) 40 MG tablet Take 1 tablet (40 mg total) by mouth once daily. 90 tablet 2    rosuvastatin (CRESTOR) 10 MG tablet Take 1 tablet (10 mg total) by mouth once daily. 90 tablet 3    albuterol (VENTOLIN HFA) 90 mcg/actuation inhaler Inhale 2 puffs into the lungs every 6 (six) hours as needed for Wheezing. Rescue 18 g 3    neomycin-polymyxin-hydrocortisone (CORTISPORIN) otic solution Place 3 drops into the left ear 4 (four) times daily.      promethazine-dextromethorphan (PROMETHAZINE-DM) 6.25-15 mg/5 mL Syrp Take 5 mLs by mouth every 8 (eight) hours as needed (cough). 120 mL 0     No current facility-administered medications for this visit.

## 2025-04-05 NOTE — ASSESSMENT & PLAN NOTE
Continue rescue inhaler.  Kenalog Injection ordered as points persistent symptoms despite treatment of flu-like symptoms.

## 2025-05-05 ENCOUNTER — OFFICE VISIT (OUTPATIENT)
Dept: PRIMARY CARE CLINIC | Facility: CLINIC | Age: 75
End: 2025-05-05
Payer: MEDICARE

## 2025-05-05 VITALS
HEART RATE: 63 BPM | WEIGHT: 180.13 LBS | DIASTOLIC BLOOD PRESSURE: 72 MMHG | SYSTOLIC BLOOD PRESSURE: 124 MMHG | HEIGHT: 66 IN | OXYGEN SATURATION: 98 % | BODY MASS INDEX: 28.95 KG/M2

## 2025-05-05 DIAGNOSIS — E78.5 HYPERLIPIDEMIA, UNSPECIFIED HYPERLIPIDEMIA TYPE: ICD-10-CM

## 2025-05-05 DIAGNOSIS — I10 ESSENTIAL HYPERTENSION: Primary | ICD-10-CM

## 2025-05-05 DIAGNOSIS — Z00.00 HEALTHCARE MAINTENANCE: ICD-10-CM

## 2025-05-05 DIAGNOSIS — N40.0 BENIGN PROSTATIC HYPERPLASIA WITHOUT LOWER URINARY TRACT SYMPTOMS: ICD-10-CM

## 2025-05-05 PROCEDURE — 99999 PR PBB SHADOW E&M-EST. PATIENT-LVL IV: CPT | Mod: PBBFAC,HCNC,, | Performed by: INTERNAL MEDICINE

## 2025-05-05 RX ORDER — PANTOPRAZOLE SODIUM 40 MG/1
40 TABLET, DELAYED RELEASE ORAL DAILY
Qty: 30 TABLET | Refills: 2 | Status: SHIPPED | OUTPATIENT
Start: 2025-05-05 | End: 2026-05-05

## 2025-05-05 RX ORDER — FLUTICASONE PROPIONATE 50 MCG
2 SPRAY, SUSPENSION (ML) NASAL DAILY
Qty: 16 G | Refills: 3 | Status: SHIPPED | OUTPATIENT
Start: 2025-05-05

## 2025-05-05 NOTE — PROGRESS NOTES
Subjective:       Patient ID: Nikolai Brown is a 74 y.o. male.    Chief Complaint: Hypertension and Medication Management      HPI  Nikolai Brown is a 74 y.o. male with HTN, HLD, Prediabetes, BPH  who presents today for Hypertension and Medication Management    He is followed by our [facility] and blood pressure has been well controlled. Watching his diet and exercising. Noted elevated triglycerides in the past that were improving.    He takes Doxazosin to reduce nocturia. Frequency ranges from 0-2 times per night, with increased frequency noted with alcohol consumption, particularly beer. He reports normal urinary stream without straining.    He reports persistent throat discomfort that has not fully resolved since initial treatment with injectable medication and syrup one month ago. He recently contracted a virus from his daughter. He manages symptoms by gargling with diluted hydrogen peroxide during episodes of discomfort, as previously prescribed by another physician.    He sleeps 6-8 hours per night using one pillow for head elevation and an additional pillow for side positioning.    Travelled to [place] and [facility]. Walked long distances with no difficulty. Reports tries to watch his diet but sometimes gets tired of vegetables and prefers beans and rice.    4Ms for Medical Decision-Making in Older Adults    Last Completed EAWV:  None    MEDICATIONS:  High Risk Medications:  Total Active Medications: 1  doxazosin - 2 MG    MOBILITY:  Activities of Daily Living:       No data to display              Fall Risk:      5/5/2025     9:00 AM 4/4/2025     4:20 PM 2/3/2025     9:40 AM   Fall Risk Assessment - Outpatient   Mobility Status Ambulatory Ambulatory Ambulatory   Number of falls 0 0 0   Identified as fall risk False False False     Disability Status:       No data to display              Nutrition Screening:       No data to display             Screening Score: 0-7 Malnourished, 8-11 At Risk,  12-14 Normal  Get Up and Go:       No data to display              Whisper Test:       No data to display                    MENTATION:   Has Dementia Dx: No  Has Anxiety Dx: No    Depression Patient Health Questionnaire:      1/13/2025    10:25 AM   Depression Patient Health Questionnaire   Over the last two weeks how often have you been bothered by little interest or pleasure in doing things Not at all   Over the last two weeks how often have you been bothered by feeling down, depressed or hopeless Not at all   PHQ-2 Total Score 0     Cognitive Function Screening:       No data to display              Cognitive Function Screening Total - Less than 4 = Abnormal,  Greater than or equal to 4 = Normal        WHAT MATTERS MOST:  Advance Care Planning   ACP Status:   Patient has had an ACP conversation  Living Will: Yes  Power of : Yes  [place]: No                Review of Systems   Constitutional:  Negative for activity change and unexpected weight change.   HENT:  Positive for sore throat. Negative for hearing loss, rhinorrhea and trouble swallowing.    Eyes:  Negative for discharge and visual disturbance.   Respiratory:  Negative for chest tightness and wheezing.    Cardiovascular:  Negative for chest pain and palpitations.   Gastrointestinal:  Negative for blood in stool, constipation, diarrhea and vomiting.   Endocrine: Negative for polydipsia and polyuria.   Genitourinary:  Negative for difficulty urinating, hematuria and urgency.   Musculoskeletal:  Negative for arthralgias, joint swelling and neck pain.   Neurological:  Negative for weakness and headaches.   Psychiatric/Behavioral:  Negative for confusion and dysphoric mood.       Past Medical History:   Diagnosis Date    BPH (benign prostatic hyperplasia)     HLD (hyperlipidemia)     HTN (hypertension)        No past surgical history on file.    Family History   Problem Relation Name Age of Onset    Stroke Mother      Heart failure Mother       Hypertension Mother      Heart disease Mother      Rheum arthritis Mother      No Known Problems Sister 2     Cancer Brother 1         Lymphoma?    Hypertension Brother 1     Seizures Maternal Grandfather         Social History     Socioeconomic History    Marital status: Unknown   Tobacco Use    Smoking status: Never    Smokeless tobacco: Never   Substance and Sexual Activity    Alcohol use: Yes     Alcohol/week: 5.0 standard drinks of alcohol     Types: 5 Cans of beer per week     Social Drivers of Health     Financial Resource Strain: Medium Risk (2/12/2025)    Overall Financial Resource Strain (CARDIA)     Difficulty of Paying Living Expenses: Somewhat hard   Food Insecurity: Food Insecurity Present (2/12/2025)    Hunger Vital Sign     Worried About Running Out of Food in the Last Year: Sometimes true     Ran Out of Food in the Last Year: Never true   Transportation Needs: No Transportation Needs (2/12/2025)    PRAPARE - Transportation     Lack of Transportation (Medical): No     Lack of Transportation (Non-Medical): No   Physical Activity: Sufficiently Active (2/12/2025)    Exercise Vital Sign     Days of Exercise per Week: 6 days     Minutes of Exercise per Session: 90 min   Stress: No Stress Concern Present (2/12/2025)    [employer] - Occupational Stress Questionnaire     Feeling of Stress : Not at all   Housing Stability: Low Risk  (2/12/2025)    Housing Stability Vital Sign     Unable to Pay for Housing in the Last Year: No     Homeless in the Last Year: No       Current Medications[1]    Review of patient's allergies indicates:  No Known Allergies      Objective:       Last 3 sets of Vitals        2/19/2025    10:00 AM 4/4/2025     4:08 PM 5/5/2025     8:47 AM   Vitals - 1 value per visit   SYSTOLIC 118 132 124   DIASTOLIC 70 86 72   Pulse 62 67 63   Temp  97.8 °F (36.6 °C)    Resp 18     SPO2 98 % 98 % 98 %   Weight (lb) 183.31 183.09 180.12   Weight (kg) 83.15 83.05 81.7  "  Height 5' 6" (1.676 m) 5' 6" (1.676 m) 5' 6" (1.676 m)   BMI (Calculated) 29.6 29.6 29.1   Pain Score Zero Four Zero   Physical Exam  Constitutional:       General: He is not in acute distress.  HENT:      Head: Normocephalic.      Right Ear: Tympanic membrane, ear canal and external ear normal.      Left Ear: Tympanic membrane, ear canal and external ear normal.      Nose: Nose normal.      Mouth/Throat:      Mouth: Mucous membranes are moist.      Pharynx: No posterior oropharyngeal erythema.      Comments: Postnasal drip present, mild  Eyes:      General: No scleral icterus.     Extraocular Movements: Extraocular movements intact.      Conjunctiva/sclera: Conjunctivae normal.   Neck:      Vascular: No carotid bruit.      Comments: No goiter.  Cardiovascular:      Rate and Rhythm: Normal rate and regular rhythm.      Pulses: Normal pulses.      Heart sounds: Normal heart sounds.   Pulmonary:      Effort: Pulmonary effort is normal.      Breath sounds: Normal breath sounds.   Abdominal:      General: Bowel sounds are normal. There is no distension.      Palpations: Abdomen is soft. There is no mass.      Tenderness: There is no abdominal tenderness.   Musculoskeletal:         General: No swelling.   Lymphadenopathy:      Cervical: No cervical adenopathy.   Skin:     General: Skin is warm and dry.   Neurological:      General: No focal deficit present.      Mental Status: He is alert and oriented to person, place, and time.   Psychiatric:         Mood and Affect: Mood normal.         Behavior: Behavior normal.           CBC:  Recent Labs   Lab 10/07/24  0825   WBC 4.90   RBC 4.72   Hemoglobin 13.7 L   Hematocrit 42.4   Platelets 221   MCV 90   MCH 29.0   MCHC 32.3     CMP:  Recent Labs   Lab 10/07/24  0825 01/10/25  0752   Glucose 100 93   Calcium 9.9 9.8   Albumin 4.3 4.5   Total Protein 7.6 7.4   Sodium 141 142   Potassium 4.4 4.6   CO2 23 25   Chloride 108 106   BUN 19 21   Creatinine 1.2 1.2   Alkaline " Phosphatase 62 56   ALT 50 H 42   AST 36 26   Total Bilirubin 0.7 0.6     URINALYSIS:       LIPIDS:  Recent Labs   Lab 10/07/24  0825 01/10/25  0752   HDL 47 47   Cholesterol 178 171   Triglycerides 198 H 185 H   LDL Cholesterol 91.4 87.0   HDL/Cholesterol Ratio 26.4 27.5   Non-HDL Cholesterol 131 124   Total Cholesterol/HDL Ratio 3.8 3.6     TSH:        A1C:  Recent Labs   Lab 10/07/24  0825   Hemoglobin A1C 5.5       Imaging:  MRI Lumbar Spine Without Contrast  Narrative: EXAMINATION:  MRI LUMBAR SPINE WITHOUT CONTRAST    CLINICAL HISTORY:  Radiculopathy, lumbar regionLumbar radiculopathy, symptoms persist with conservative treatment;    TECHNIQUE:  Sagittal T1, sagittal T2, sagittal STIR, axial T1 and axial T2 weighted images of the lumbar spine obtained without contrast.    COMPARISON:  None    FINDINGS:  Lumbar spine alignment is within normal limits. The vertebral body heights are well maintained, with no fracture.  Enthesopathic edema of the anterior superior corners of L4 and L3.  Marrow signal otherwise normal.    The conus is normal in appearance.  The adjacent soft tissue structures show no significant abnormalities.    There is multilevel lumbar disc desiccation.  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    L1-L2: There is no focal disc herniation. No significant central canal or neural foraminal narrowing.  Perineural cyst in the left neural foramen.    L2-L3: Circumferential disc bulge.  Mild central canal stenosis.    L3-L4: Circumferential disc bulging.  Mild central canal stenosis.    L4-L5: Circumferential disc bulge and bilateral facet joint effusions.  No significant central canal or neural foraminal narrowing.    L5-S1: Circumferential disc bulge.  No significant central canal or neural foraminal narrowing.  Impression: Minimal degenerative changes with mild central canal stenosis in the mid lumbar region mainly on the basis of short  pedicles.    Electronically signed by: Ezekiel Crowder Jr  Date:    01/17/2025  Time:    16:27      Assessment:       1. Essential hypertension    2. Hyperlipidemia, unspecified hyperlipidemia type    3. Healthcare maintenance    4. Benign prostatic hyperplasia without lower urinary tract symptoms            Plan:       1. Essential hypertension  Overview:  On Olmesartan 40mg and now on Doxazosin 2 mg Nightly .     Assessment & Plan:  Controlled blood pressure.  Continue to monitor with same treatment.    Orders:  -     CBC Auto Differential; Future; Expected date: 05/05/2025  -     Comprehensive Metabolic Panel; Future; Expected date: 05/05/2025    2. Hyperlipidemia, unspecified hyperlipidemia type  Overview:  On Crestor 10 mg daily.    Orders:  -     Lipid Panel; Future; Expected date: 05/05/2025    3. Healthcare maintenance    4. Benign prostatic hyperplasia without lower urinary tract symptoms  Overview:  On finasteride 5mg and Doxazosin 2mg Nightly.    Assessment & Plan:  Reports no obstructive symptoms with present treatment.        Other orders  -     pantoprazole (PROTONIX) 40 MG tablet; Take 1 tablet (40 mg total) by mouth once daily.  Dispense: 30 tablet; Refill: 2  -     fluticasone propionate (FLONASE) 50 mcg/actuation nasal spray; 2 sprays (100 mcg total) by Each Nostril route once daily.  Dispense: 16 g; Refill: 3       THROAT PAIN AND IRRITATION:  - Nikolai reports ongoing throat discomfort, not fully recovered after previous treatment with injection and syrup.  - Examination revealed no swelling or infection, though secretions appear thick but not serious.  - Throat irritation may be related to multiple factors including reflux and allergies.  - No indication for antibiotics at this time.    GASTROESOPHAGEAL REFLUX DISEASE (GERD):  - Prescribed pantoprazole for 30 days with two doses to treat throat irritation possibly caused by reflux.  - If helpful, patient may repeat for an additional 2  weeks.  - Recommend elevating head while sleeping by using an additional pillow to reduce reflux symptoms.    ALLERGIC RHINITIS:  - Discussed allergies as a possible cause of throat discomfort.  - Prescribed Flonase nasal spray to treat potential allergies and associated irritation.    RESPIRATORY SYSTEM MANAGEMENT:  - Recommend increased water intake to help thin throat secretions and improve hydration status.    FOLLOW-UP PLAN:  - Ordered labs for next visit.  - Nikolai to follow up in 6 months.        Health Maintenance Due   Topic Date Due    Hepatitis C Screening  Never done      I spent a total of 38 minutes on the day of the visit.  This includes face to face time and non-face to face time preparing to see the patient (eg, review of tests), obtaining and/or reviewing separately obtained history, documenting clinical information in the electronic or other health record, independently interpreting results and communicating results to the patient/family/caregiver, or care coordinator.     RETURN TO CLINIC IN: 6 MONTHS    FOR NEXT VISIT: BLOOD PRESSURE MONITORING, MEDICATION MONITORING, and WEIGHT MANAGEMENT/NUTRITION       Kady Nowak MD  [facility]  Disclaimer:  This note has been generated using voice-recognition software. There may be grammatical or spelling errors that have been missed during proof-reading           [1]   Current Outpatient Medications   Medication Sig Dispense Refill    ascorbic acid, vitamin C, (VITAMIN C) 1000 MG tablet Take 1,000 mg by mouth once daily.      doxazosin (CARDURA) 2 MG tablet Take 1 tablet (2 mg total) by mouth every evening. 30 tablet 11    finasteride (PROSCAR) 5 mg tablet Take 1 tablet (5 mg total) by mouth every evening. 30 tablet 3    loratadine (CLARITIN) 10 mg tablet Take 1 tablet (10 mg total) by mouth daily as needed for Allergies. 30 tablet 0    magnesium 250 mg Tab Take by mouth once.      multivit with min-folic acid (CENTRUM ADULT 50 PLUS) 80 mcg Chew  Take by mouth.      naproxen (NAPROSYN) 500 MG tablet Take 1 tablet (500 mg total) by mouth every 12 (twelve) hours as needed (pain). 40 tablet 0    olmesartan (BENICAR) 40 MG tablet Take 1 tablet (40 mg total) by mouth once daily. 90 tablet 2    rosuvastatin (CRESTOR) 10 MG tablet Take 1 tablet (10 mg total) by mouth once daily. 90 tablet 3    fluticasone propionate (FLONASE) 50 mcg/actuation nasal spray 2 sprays (100 mcg total) by Each Nostril route once daily. 16 g 3    pantoprazole (PROTONIX) 40 MG tablet Take 1 tablet (40 mg total) by mouth once daily. 30 tablet 2     No current facility-administered medications for this visit.

## 2025-05-05 NOTE — ASSESSMENT & PLAN NOTE
- Yearly labs- 10/7//24.  - Colon cancer screening- Cologuard on 1/10/23, negative.  - ACP- completed 8/26/24.  - Vaccination-  declined.

## 2025-05-06 ENCOUNTER — PATIENT MESSAGE (OUTPATIENT)
Dept: ADMINISTRATIVE | Facility: OTHER | Age: 75
End: 2025-05-06
Payer: MEDICARE

## 2025-07-07 ENCOUNTER — TELEPHONE (OUTPATIENT)
Dept: PRIMARY CARE CLINIC | Facility: CLINIC | Age: 75
End: 2025-07-07
Payer: MEDICARE

## 2025-07-07 NOTE — TELEPHONE ENCOUNTER
Copied from CRM #[id number]. Topic: General Inquiry - Patient Advice  >> Jul 7, 2025  9:00 AM Virgilio wrote:  .1MEDICALADVICE     Patient is calling for Medical Advice regarding:  cold cough    How long has patient had these symptoms: last night    Pharmacy name and phone#:   [facility]  [address]  [place] [address]  Phone: [phone] Fax: [phone]        Patient wants a call back or thru [facility], provide patient's call back phone number: Nikolai [phone] (M)      Comments:    Please advise patient replies from provider may take up to 48 hours.

## 2025-07-07 NOTE — TELEPHONE ENCOUNTER
----- Message from Jolene Camarillo MD sent at 5/3/2017  9:49 PM CDT -----  Please call patient with the results, no evidence of anuerysm RN spoke to Dr. Nowak and pt and informed him she recommends flonase and coricidin HBP.

## 2025-07-07 NOTE — TELEPHONE ENCOUNTER
Pt is not feeling well, symptoms: cough, sore throat, and running nose. Yesterday night started feeling unwell. Sore throat. Offered an appt with available pcp, pt requesting Rx if possible without an appt. Please advise.

## 2025-07-09 ENCOUNTER — OFFICE VISIT (OUTPATIENT)
Dept: PRIMARY CARE CLINIC | Facility: CLINIC | Age: 75
End: 2025-07-09
Payer: MEDICARE

## 2025-07-09 VITALS
OXYGEN SATURATION: 96 % | SYSTOLIC BLOOD PRESSURE: 115 MMHG | TEMPERATURE: 98 F | WEIGHT: 185.63 LBS | HEART RATE: 81 BPM | RESPIRATION RATE: 18 BRPM | BODY MASS INDEX: 29.96 KG/M2 | DIASTOLIC BLOOD PRESSURE: 58 MMHG

## 2025-07-09 DIAGNOSIS — J02.9 SORE THROAT: ICD-10-CM

## 2025-07-09 DIAGNOSIS — J06.9 UPPER RESPIRATORY TRACT INFECTION, UNSPECIFIED TYPE: Primary | ICD-10-CM

## 2025-07-09 PROCEDURE — 99999 PR PBB SHADOW E&M-EST. PATIENT-LVL IV: CPT | Mod: PBBFAC,HCNC,, | Performed by: INTERNAL MEDICINE

## 2025-07-09 RX ORDER — AZITHROMYCIN 250 MG/1
TABLET, FILM COATED ORAL
Qty: 6 TABLET | Refills: 0 | Status: SHIPPED | OUTPATIENT
Start: 2025-07-09 | End: 2025-07-14

## 2025-07-09 RX ORDER — TRIAMCINOLONE ACETONIDE 40 MG/ML
40 INJECTION, SUSPENSION INTRA-ARTICULAR; INTRAMUSCULAR ONCE
Status: COMPLETED | OUTPATIENT
Start: 2025-07-09 | End: 2025-07-09

## 2025-07-09 RX ORDER — BENZONATATE 100 MG/1
100 CAPSULE ORAL 3 TIMES DAILY PRN
Qty: 30 CAPSULE | Refills: 1 | Status: SHIPPED | OUTPATIENT
Start: 2025-07-09 | End: 2025-07-19

## 2025-07-09 RX ORDER — NEOMYCIN SULFATE, POLYMYXIN B SULFATE AND HYDROCORTISONE 10; 3.5; 1 MG/ML; MG/ML; [USP'U]/ML
3 SUSPENSION/ DROPS AURICULAR (OTIC) 3 TIMES DAILY
Qty: 10 ML | Refills: 0 | Status: SHIPPED | OUTPATIENT
Start: 2025-07-09

## 2025-07-09 RX ADMIN — TRIAMCINOLONE ACETONIDE 40 MG: 40 INJECTION, SUSPENSION INTRA-ARTICULAR; INTRAMUSCULAR at 11:07

## 2025-07-11 LAB
CTP QC/QA: YES
POC MOLECULAR INFLUENZA A AGN: NEGATIVE
POC MOLECULAR INFLUENZA B AGN: NEGATIVE
S PYO RRNA THROAT QL PROBE: NEGATIVE
SARS-COV-2 RDRP RESP QL NAA+PROBE: NEGATIVE

## 2025-07-11 NOTE — PROGRESS NOTES
Subjective:       Patient ID: Nikolai Brown is a 74 y.o. male.    Chief Complaint: URI      URI   Associated symptoms include coughing, ear pain and a sore throat. Pertinent negatives include no abdominal pain.   Sore Throat   The current episode started yesterday. The problem has been gradually worsening. The pain is worse on the left side. There has been no fever. The pain is at a severity of 10/10. The pain is severe. Associated symptoms include coughing and ear pain. Pertinent negatives include no abdominal pain. He has had exposure to strep. He has tried NSAIDs and cool liquids for the symptoms. The treatment provided no relief.     Nikolai Brown is a 74 y.o. male with HTN, HLD, Prediabetes, BPH who presents today for URI    Nikolai presents today for cough and sore throat.    He reports a persistent productive cough with yellow phlegm ongoing for two weeks. He experiences moderate throat pain, chest and lung congestion, right ear discomfort, and intermittent mild nasal congestion with some phlegm production. Sleep is significantly disrupted, requiring him to sleep sitting up on the couch due to cough symptoms. He denies fever, wheezing, chest pain, body aches, or leg swelling.    He reports an episode of frequent urination, requiring approximately 25 bathroom trips in a single day with urgent and persistent need to urinate. Even walking a short distance from the bathroom to the couch triggered another urge to urinate. Urine appearance was normal during these episodes.    PREVIOUS TREATMENT:  He was seen by another healthcare provider who prescribed antibiotics and cough syrup. He has been self-medicating with OTC Nyquil for symptomatic relief but has discontinued its use. He is currently awaiting additional medication, including cough pills and antibiotics.    He reports recent travel to [place].    ROS:  General: -fever, -chills, -fatigue, -weight gain, -weight loss, +sleep disturbances  Eyes: -vision  changes, -redness, -discharge  ENT: +ear pain, +nasal congestion, +sore throat  Cardiovascular: -chest pain, -palpitations, -lower extremity edema  Respiratory: +cough, -shortness of breath, +productive cough, +chest congestion  Gastrointestinal: -abdominal pain, -nausea, -vomiting, -diarrhea, -constipation, -blood in stool  Genitourinary: -dysuria, -hematuria, -frequency, +excessive urination  Musculoskeletal: -joint pain, -muscle pain  Skin: -rash, -lesion  Neurological: -headache, -dizziness, -numbness, -tingling  Psychiatric: -anxiety, -depression, -sleep difficulty         From  pre visits questionnaire:  Review of Systems   HENT:  Positive for ear pain and sore throat.    Respiratory:  Positive for cough.    Gastrointestinal:  Negative for abdominal pain.      Advance Care Planning     Date: 07/10/2025    ACP Reviewed/No Changes  Voluntary advance care planning discussion had today with patient. Previously completed HCPOA and LW in electronic medical record is current, no changes made.      A total of  less than 5 min was spent on advance care planning. This discussion occurred on a fully voluntary basis with the verbal consent of the patient and/or family.        Past Medical History:   Diagnosis Date    BPH (benign prostatic hyperplasia)     HLD (hyperlipidemia)     HTN (hypertension)        History reviewed. No pertinent surgical history.    Family History   Problem Relation Name Age of Onset    Stroke Mother      Heart failure Mother      Hypertension Mother      Heart disease Mother      Rheum arthritis Mother      No Known Problems Sister 2     Cancer Brother 1         Lymphoma?    Hypertension Brother 1     Seizures Maternal Grandfather         Social History     Socioeconomic History    Marital status: Unknown   Tobacco Use    Smoking status: Never    Smokeless tobacco: Never   Substance and Sexual Activity    Alcohol use: Yes     Alcohol/week: 5.0 standard drinks of alcohol     Types: 5 Cans of beer  "per week     Social Drivers of Health     Financial Resource Strain: Medium Risk (2/12/2025)    Overall Financial Resource Strain (CARDIA)     Difficulty of Paying Living Expenses: Somewhat hard   Food Insecurity: Food Insecurity Present (2/12/2025)    Hunger Vital Sign     Worried About Running Out of Food in the Last Year: Sometimes true     Ran Out of Food in the Last Year: Never true   Transportation Needs: No Transportation Needs (2/12/2025)    PRAPARE - Transportation     Lack of Transportation (Medical): No     Lack of Transportation (Non-Medical): No   Physical Activity: Sufficiently Active (2/12/2025)    Exercise Vital Sign     Days of Exercise per Week: 6 days     Minutes of Exercise per Session: 90 min   Stress: No Stress Concern Present (2/12/2025)    [employer] - Occupational Stress Questionnaire     Feeling of Stress : Not at all   Housing Stability: Low Risk  (2/12/2025)    Housing Stability Vital Sign     Unable to Pay for Housing in the Last Year: No     Homeless in the Last Year: No       Current Medications[1]    Review of patient's allergies indicates:  No Known Allergies      Objective:       Last 3 sets of Vitals        4/4/2025     4:08 PM 5/5/2025     8:47 AM 7/9/2025    10:39 AM   Vitals - 1 value per visit   SYSTOLIC 132 124 115   DIASTOLIC 86 72 58   Pulse 67 63 81   Temp 97.8 °F (36.6 °C)  98 °F (36.7 °C)   Resp   18   SPO2 98 % 98 % 96 %   Weight (lb) 183.09 180.12 185.63   Weight (kg) 83.05 81.7 84.2   Height 5' 6" (1.676 m) 5' 6" (1.676 m)    BMI (Calculated) 29.6 29.1    Pain Score Four Zero Ten   Physical Exam  Constitutional:       General: He is not in acute distress.  HENT:      Head: Normocephalic.      Right Ear: Tympanic membrane, ear canal and external ear normal.      Left Ear: Tympanic membrane, ear canal and external ear normal.      Ears:      Comments:  Wax present on right ear without complete occlusion     Nose: Congestion present.      " Mouth/Throat:      Mouth: Mucous membranes are moist.      Pharynx: Posterior oropharyngeal erythema present.   Eyes:      General: No scleral icterus.     Extraocular Movements: Extraocular movements intact.      Conjunctiva/sclera: Conjunctivae normal.   Neck:      Vascular: No carotid bruit.      Comments: No goiter.  Cardiovascular:      Rate and Rhythm: Normal rate and regular rhythm.      Pulses: Normal pulses.      Heart sounds: Normal heart sounds.   Pulmonary:      Effort: Pulmonary effort is normal.      Breath sounds: Normal breath sounds.   Abdominal:      General: Bowel sounds are normal. There is no distension.      Palpations: Abdomen is soft. There is no mass.      Tenderness: There is no abdominal tenderness.   Musculoskeletal:         General: No swelling.      Cervical back: Neck supple.   Lymphadenopathy:      Cervical: No cervical adenopathy.   Skin:     General: Skin is warm and dry.   Neurological:      General: No focal deficit present.      Mental Status: He is alert and oriented to person, place, and time.   Psychiatric:         Mood and Affect: Mood normal.         Behavior: Behavior normal.           CBC:  Recent Labs   Lab 10/07/24  0825   WBC 4.90   RBC 4.72   Hemoglobin 13.7 L   Hematocrit 42.4   Platelets 221   MCV 90   MCH 29.0   MCHC 32.3     CMP:  Recent Labs   Lab 10/07/24  0825 01/10/25  0752   Glucose 100 93   Calcium 9.9 9.8   Albumin 4.3 4.5   Total Protein 7.6 7.4   Sodium 141 142   Potassium 4.4 4.6   CO2 23 25   Chloride 108 106   BUN 19 21   Creatinine 1.2 1.2   Alkaline Phosphatase 62 56   ALT 50 H 42   AST 36 26   Total Bilirubin 0.7 0.6     URINALYSIS:       LIPIDS:  Recent Labs   Lab 10/07/24  0825 01/10/25  0752   HDL 47 47   Cholesterol 178 171   Triglycerides 198 H 185 H   LDL Cholesterol 91.4 87.0   HDL/Cholesterol Ratio 26.4 27.5   Non-HDL Cholesterol 131 124   Total Cholesterol/HDL Ratio 3.8 3.6     TSH:        A1C:  Recent Labs   Lab 10/07/24  0825   Hemoglobin  A1C 5.5       Imaging:  MRI Lumbar Spine Without Contrast  Narrative: EXAMINATION:  MRI LUMBAR SPINE WITHOUT CONTRAST    CLINICAL HISTORY:  Radiculopathy, lumbar regionLumbar radiculopathy, symptoms persist with conservative treatment;    TECHNIQUE:  Sagittal T1, sagittal T2, sagittal STIR, axial T1 and axial T2 weighted images of the lumbar spine obtained without contrast.    COMPARISON:  None    FINDINGS:  Lumbar spine alignment is within normal limits. The vertebral body heights are well maintained, with no fracture.  Enthesopathic edema of the anterior superior corners of L4 and L3.  Marrow signal otherwise normal.    The conus is normal in appearance.  The adjacent soft tissue structures show no significant abnormalities.    There is multilevel lumbar disc desiccation.  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    L1-L2: There is no focal disc herniation. No significant central canal or neural foraminal narrowing.  Perineural cyst in the left neural foramen.    L2-L3: Circumferential disc bulge.  Mild central canal stenosis.    L3-L4: Circumferential disc bulging.  Mild central canal stenosis.    L4-L5: Circumferential disc bulge and bilateral facet joint effusions.  No significant central canal or neural foraminal narrowing.    L5-S1: Circumferential disc bulge.  No significant central canal or neural foraminal narrowing.  Impression: Minimal degenerative changes with mild central canal stenosis in the mid lumbar region mainly on the basis of short pedicles.    Electronically signed by: Ezekiel Crowder Jr  Date:    01/17/2025  Time:    16:27      Assessment:       1. Upper respiratory tract infection, unspecified type    2. Sore throat            Plan:       1. Upper respiratory tract infection, unspecified type  -     azithromycin (Z-SHAZIA) 250 MG tablet; Take 2 tablets by mouth on day 1; Take 1 tablet by mouth on days 2-5  Dispense: 6 tablet; Refill: 0  -      benzonatate (TESSALON) 100 MG capsule; Take 1 capsule (100 mg total) by mouth 3 (three) times daily as needed for Cough.  Dispense: 30 capsule; Refill: 1    2. Sore throat  -     POCT COVID-19 Rapid Screening  -     POCT Influenza A/B Molecular  -     POCT Rapid Strep A    Other orders  -     neomycin-polymyxin-hydrocortisone (CORTISPORIN) 3.5-10,000-1 mg/mL-unit/mL-% otic suspension; Place 3 drops into the right ear 3 (three) times daily.  Dispense: 10 mL; Refill: 0  -     triamcinolone acetonide injection 40 mg       Assessment & Plan    > Low suspicion for COVID, flu, and strep based on clinical presentation.  > COVID test negative, performed in office.  > Initiated treatment with  triamcinolone injection and oral medications due to prolonged symptom duration.  > Slightly low BP, recommended increased fluid intake, particularly water.  > Explained potential for cerumen buildup to cause ear blockage.    ACUTE PHARYNGITIS:  - Nikolai reports throat pain, congestion in throat and lungs, and has been coughing up yellow phlegm for 2 weeks.  - Evaluated symptoms and determined low likelihood of strep throat despite presenting symptoms.  - Discussed importance of proper antibiotic use and potential side effects, including GI issues and oral thrush.    ACUTE COUGH:  - Prescribed Mucinex DM 12-hour extended release to be taken twice daily with plenty of water for cough management and to thin mucus for easier expectoration.  - Also prescribed Tessalon Perles for cough suppression based on previous positive response.  - Advised to continue NyQuil at night for sleep as needed, but cautioned against concurrent use with Claritin due to overlapping antihistamine properties.    IMPACTED CERUMEN, RIGHT EAR:  - Nikolai reports right ear discomfort and occasional use of ear treatment.  - Examination revealed more cerumen than expected in the ear canal.  - Recommend using a solution of half water, half hydrogen peroxide drops for  cerumen management.           Health Maintenance Due   Topic Date Due    Hepatitis C Screening  Never done    RSV Vaccine (Age 60+ and Pregnant patients) (1 - Risk 60-74 years 1-dose series) Never done        I spent a total of 30 minutes on the day of the visit.This includes face to face time and non-face to face time preparing to see the patient (eg, review of tests), obtaining and/or reviewing separately obtained history, documenting clinical information in the electronic or other health record, independently interpreting results and communicating results to the patient/family/caregiver, or care coordinator.     RETURN TO CLINIC IN: 4 months    FOR NEXT VISIT: MEDICATION MONITORING       Kady Nowak MD  [facility]  Disclaimer:  This note has been generated using voice-recognition software. There may be grammatical or spelling errors that have been missed during proof-reading      This note was generated with the assistance of ambient listening technology. Verbal consent was obtained by the patient and accompanying visitor(s) for the recording of patient appointment to facilitate this note. I attest to having reviewed and edited the generated note for accuracy, though some syntax or spelling errors may persist. Please contact the author of this note for any clarification.          [1]   Current Outpatient Medications   Medication Sig Dispense Refill    ascorbic acid, vitamin C, (VITAMIN C) 1000 MG tablet Take 1,000 mg by mouth once daily.      azithromycin (Z-SHAZIA) 250 MG tablet Take 2 tablets by mouth on day 1; Take 1 tablet by mouth on days 2-5 6 tablet 0    benzonatate (TESSALON) 100 MG capsule Take 1 capsule (100 mg total) by mouth 3 (three) times daily as needed for Cough. 30 capsule 1    doxazosin (CARDURA) 2 MG tablet Take 1 tablet (2 mg total) by mouth every evening. 30 tablet 11    finasteride (PROSCAR) 5 mg tablet Take 1 tablet (5 mg total) by mouth every evening. 30 tablet 3    magnesium 250 mg  Tab Take by mouth once.      multivit with min-folic acid (CENTRUM ADULT 50 PLUS) 80 mcg Chew Take by mouth.      naproxen (NAPROSYN) 500 MG tablet Take 1 tablet (500 mg total) by mouth every 12 (twelve) hours as needed (pain). 40 tablet 0    neomycin-polymyxin-hydrocortisone (CORTISPORIN) 3.5-10,000-1 mg/mL-unit/mL-% otic suspension Place 3 drops into the right ear 3 (three) times daily. 10 mL 0    olmesartan (BENICAR) 40 MG tablet Take 1 tablet (40 mg total) by mouth once daily. 90 tablet 2    pantoprazole (PROTONIX) 40 MG tablet Take 1 tablet (40 mg total) by mouth once daily. 30 tablet 2    rosuvastatin (CRESTOR) 10 MG tablet Take 1 tablet (10 mg total) by mouth once daily. 90 tablet 3     No current facility-administered medications for this visit.

## 2025-09-01 ENCOUNTER — OCHSNER VIRTUAL EMERGENCY DEPARTMENT (OUTPATIENT)
Facility: CLINIC | Age: 75
End: 2025-09-01
Payer: MEDICARE

## 2025-09-01 ENCOUNTER — PATIENT OUTREACH (OUTPATIENT)
Facility: OTHER | Age: 75
End: 2025-09-01
Payer: MEDICARE

## 2025-09-01 ENCOUNTER — NURSE TRIAGE (OUTPATIENT)
Dept: ADMINISTRATIVE | Facility: CLINIC | Age: 75
End: 2025-09-01
Payer: MEDICARE

## 2025-09-01 DIAGNOSIS — U07.1 COVID-19 VIRUS INFECTION: Primary | ICD-10-CM

## 2025-09-01 DIAGNOSIS — U07.1 COVID-19: Primary | ICD-10-CM

## 2025-09-02 RX ORDER — NIRMATRELVIR AND RITONAVIR 300-100 MG
KIT ORAL
Qty: 30 TABLET | Refills: 0 | Status: SHIPPED | OUTPATIENT
Start: 2025-09-02 | End: 2025-09-02 | Stop reason: SDUPTHER

## 2025-09-02 RX ORDER — NIRMATRELVIR AND RITONAVIR 300-100 MG
KIT ORAL
Qty: 30 TABLET | Refills: 0 | Status: SHIPPED | OUTPATIENT
Start: 2025-09-02

## 2025-09-03 ENCOUNTER — PATIENT OUTREACH (OUTPATIENT)
Facility: OTHER | Age: 75
End: 2025-09-03
Payer: MEDICARE